# Patient Record
Sex: FEMALE | Race: BLACK OR AFRICAN AMERICAN | Employment: FULL TIME | URBAN - METROPOLITAN AREA
[De-identification: names, ages, dates, MRNs, and addresses within clinical notes are randomized per-mention and may not be internally consistent; named-entity substitution may affect disease eponyms.]

---

## 2017-03-31 ENCOUNTER — HOSPITAL ENCOUNTER (EMERGENCY)
Age: 57
Discharge: HOME | End: 2017-03-31
Attending: EMERGENCY MEDICINE | Admitting: EMERGENCY MEDICINE
Payer: SELF-PAY

## 2017-03-31 VITALS
WEIGHT: 170 LBS | TEMPERATURE: 103.1 F | HEART RATE: 98 BPM | DIASTOLIC BLOOD PRESSURE: 75 MMHG | BODY MASS INDEX: 25.76 KG/M2 | OXYGEN SATURATION: 98 % | HEIGHT: 68 IN | RESPIRATION RATE: 20 BRPM | SYSTOLIC BLOOD PRESSURE: 142 MMHG

## 2017-03-31 DIAGNOSIS — J10.1 INFLUENZA B: Primary | ICD-10-CM

## 2017-03-31 LAB
FLUAV AG NPH QL IA: NEGATIVE
FLUBV AG NOSE QL IA: POSITIVE

## 2017-03-31 PROCEDURE — 74011250637 HC RX REV CODE- 250/637: Performed by: PHYSICIAN ASSISTANT

## 2017-03-31 PROCEDURE — 99283 EMERGENCY DEPT VISIT LOW MDM: CPT

## 2017-03-31 PROCEDURE — 87804 INFLUENZA ASSAY W/OPTIC: CPT | Performed by: PHYSICIAN ASSISTANT

## 2017-03-31 RX ORDER — OSELTAMIVIR PHOSPHATE 75 MG/1
75 CAPSULE ORAL 2 TIMES DAILY
Qty: 10 CAP | Refills: 0 | Status: SHIPPED | OUTPATIENT
Start: 2017-03-31 | End: 2017-04-05

## 2017-03-31 RX ORDER — ACETAMINOPHEN 500 MG
1000 TABLET ORAL
Status: COMPLETED | OUTPATIENT
Start: 2017-03-31 | End: 2017-03-31

## 2017-03-31 RX ORDER — IBUPROFEN 400 MG/1
800 TABLET ORAL
Status: COMPLETED | OUTPATIENT
Start: 2017-03-31 | End: 2017-03-31

## 2017-03-31 RX ADMIN — ACETAMINOPHEN 1000 MG: 500 TABLET ORAL at 19:13

## 2017-03-31 RX ADMIN — IBUPROFEN 800 MG: 400 TABLET, FILM COATED ORAL at 19:13

## 2017-03-31 NOTE — DISCHARGE INSTRUCTIONS
Influenza (Flu): Care Instructions  Your Care Instructions  Influenza (flu) is an infection in the lungs and breathing passages. It is caused by the influenza virus. There are different strains, or types, of the flu virus from year to year. Unlike the common cold, the flu comes on suddenly and the symptoms, such as a cough, congestion, fever, chills, fatigue, aches, and pains, are more severe. These symptoms may last up to 10 days. Although the flu can make you feel very sick, it usually doesn't cause serious health problems. Home treatment is usually all you need for flu symptoms. But your doctor may prescribe antiviral medicine to prevent other health problems, such as pneumonia, from developing. Older people and those who have a long-term health condition, such as lung disease, are most at risk for having pneumonia or other health problems. Follow-up care is a key part of your treatment and safety. Be sure to make and go to all appointments, and call your doctor if you are having problems. Its also a good idea to know your test results and keep a list of the medicines you take. How can you care for yourself at home? · Get plenty of rest.  · Drink plenty of fluids, enough so that your urine is light yellow or clear like water. If you have kidney, heart, or liver disease and have to limit fluids, talk with your doctor before you increase the amount of fluids you drink. · Take an over-the-counter pain medicine if needed, such as acetaminophen (Tylenol), ibuprofen (Advil, Motrin), or naproxen (Aleve), to relieve fever, headache, and muscle aches. Read and follow all instructions on the label. No one younger than 20 should take aspirin. It has been linked to Reye syndrome, a serious illness. · Do not smoke. Smoking can make the flu worse. If you need help quitting, talk to your doctor about stop-smoking programs and medicines. These can increase your chances of quitting for good.   · Breathe moist air from a hot shower or from a sink filled with hot water to help clear a stuffy nose. · Before you use cough and cold medicines, check the label. These medicines may not be safe for young children or for people with certain health problems. · If the skin around your nose and lips becomes sore, put some petroleum jelly on the area. · To ease coughing:  ¨ Drink fluids to soothe a scratchy throat. ¨ Suck on cough drops or plain hard candy. ¨ Take an over-the-counter cough medicine that contains dextromethorphan to help you get some sleep. Read and follow all instructions on the label. ¨ Raise your head at night with an extra pillow. This may help you rest if coughing keeps you awake. · Take any prescribed medicine exactly as directed. Call your doctor if you think you are having a problem with your medicine. To avoid spreading the flu  · Wash your hands regularly, and keep your hands away from your face. · Stay home from school, work, and other public places until you are feeling better and your fever has been gone for at least 24 hours. The fever needs to have gone away on its own without the help of medicine. · Ask people living with you to talk to their doctors about preventing the flu. They may get antiviral medicine to keep from getting the flu from you. · To prevent the flu in the future, get a flu vaccine every fall. Encourage people living with you to get the vaccine. · Cover your mouth when you cough or sneeze. When should you call for help? Call 911 anytime you think you may need emergency care. For example, call if:  · You have severe trouble breathing. Call your doctor now or seek immediate medical care if:  · You have new or worse trouble breathing. · You seem to be getting much sicker. · You feel very sleepy or confused. · You have a new or higher fever. · You get a new rash.   Watch closely for changes in your health, and be sure to contact your doctor if:  · You begin to get better and then get worse. · You are not getting better after 1 week. Where can you learn more? Go to http://eve-john.info/. Enter H924 in the search box to learn more about \"Influenza (Flu): Care Instructions. \"  Current as of: May 23, 2016  Content Version: 11.2  © 2383-8591 [employer]. Care instructions adapted under license by [employer] (which disclaims liability or warranty for this information). If you have questions about a medical condition or this instruction, always ask your healthcare professional. [address] any warranty or liability for your use of this information.

## 2017-03-31 NOTE — ED PROVIDER NOTES
HPI Comments: Lynnette Montague is a 61 y.o. female who presents to the emergency department for evaluation of nasal congestion, subjective fever, body aches, HA, cough, chills x 3 days. She relates having tried DayQuil and NyQuil. She states she took some ibuprofen this morning. She tried to go to work, but was sent home. Pt relates some post-tussive vomiting. 2 episodes of loose stools. Relates an ill contact at work. Pt denies any dizziness or light headedness, CP or discomfort, SOB, constipation, abd pain, back pain, diaphoresis, melena/hematochezia, dysuria, hematuria, frequency, focal weakness/numbness/tingling, or rash. Patient has no other complaints at this time. PCP: Gagan Justice MD           History reviewed. No pertinent past medical history. History reviewed. No pertinent surgical history. History reviewed. No pertinent family history. Social History     Social History    Marital status:      Spouse name: N/A    Number of children: N/A    Years of education: N/A     Occupational History    Not on file. Social History Main Topics    Smoking status: Never Smoker    Smokeless tobacco: Not on file    Alcohol use No    Drug use: No    Sexual activity: Not on file     Other Topics Concern    Not on file     Social History Narrative    No narrative on file         ALLERGIES: Review of patient's allergies indicates no known allergies. Review of Systems   Constitutional: Positive for chills and fever. HENT: Positive for congestion and rhinorrhea. Negative for sore throat. Respiratory: Positive for cough. Negative for shortness of breath. Cardiovascular: Negative for chest pain. Gastrointestinal: Positive for diarrhea, nausea and vomiting. Negative for abdominal pain, blood in stool and constipation. Genitourinary: Negative for dysuria, frequency and hematuria. Musculoskeletal: Positive for myalgias. Negative for back pain.    Skin: Negative for rash and wound. Neurological: Positive for headaches. Negative for dizziness and numbness. Vitals:    03/31/17 1902   BP: 142/75   Pulse: 98   Resp: 20   Temp: (!) 103.1 °F (39.5 °C)   SpO2: 98%   Weight: 77.1 kg (170 lb)   Height: 5' 8\" (1.727 m)            Physical Exam   Constitutional: She is oriented to person, place, and time. She appears well-developed and well-nourished. No distress. HENT:   Head: Normocephalic and atraumatic. Nose: Rhinorrhea present. Mouth/Throat: Oropharynx is clear and moist. No oropharyngeal exudate. Eyes: EOM are normal. Pupils are equal, round, and reactive to light. Right eye exhibits no discharge. Left eye exhibits no discharge. Bilateral conjunctival erythema   Neck: Normal range of motion. Neck supple. No thyromegaly present. Cardiovascular: Normal rate and intact distal pulses. Exam reveals no gallop and no friction rub. No murmur heard. Pulmonary/Chest: Effort normal and breath sounds normal. No respiratory distress. She has no wheezes. She has no rales. She exhibits no tenderness. Lungs CTAB   Lymphadenopathy:     She has no cervical adenopathy. Neurological: She is alert and oriented to person, place, and time. Skin: Skin is warm and dry. No rash noted. She is not diaphoretic. Psychiatric: She has a normal mood and affect. Her behavior is normal.   Nursing note and vitals reviewed. MDM  Number of Diagnoses or Management Options  Influenza B: new and requires workup  Diagnosis management comments: Differential Diagnosis:  influenza, mononucleosis, acute bronchitis, URI, streptococcal pharyngitis, pertussis, pneumonia, asthma exacerbation, allergic rhinitis      Plan: Pt presents in NAD, febrile at 103F. Exam and hpi c/w viral etiology. Flu test is positive. Will DC home with tamiflu. Advised to drink plenty of fluids and get plenty of rest.  At this time, patient is stable and appropriate for discharge home.   Patient demonstrates understanding of current diagnoses and is in agreement with the treatment plan. They are advised that while the likelihood of serious underlying condition is low at this point given the evaluation performed today, we cannot fully rule it out. They are advised to immediately return with any new symptoms or worsening of current condition. All questions have been answered. Patient is given educational material regarding their diagnoses, including danger symptoms and when to return to the ED. Amount and/or Complexity of Data Reviewed  Clinical lab tests: ordered and reviewed  Review and summarize past medical records: yes    Risk of Complications, Morbidity, and/or Mortality  Presenting problems: moderate  Diagnostic procedures: moderate  Management options: moderate    Patient Progress  Patient progress: improved    ED Course       Procedures    -------------------------------------------------------------------------------------------------------------------  Orders:  Orders Placed This Encounter    INFLUENZA A & B AG (RAPID TEST)     Standing Status:   Standing     Number of Occurrences:   1    ibuprofen (MOTRIN) tablet 800 mg    acetaminophen (TYLENOL) tablet 1,000 mg    oseltamivir (TAMIFLU) 75 mg capsule     Sig: Take 1 Cap by mouth two (2) times a day for 5 days. Dispense:  10 Cap     Refill:  0      Lab Results:   Recent Results (from the past 12 hour(s))   INFLUENZA A & B AG (RAPID TEST)    Collection Time: 03/31/17  7:05 PM   Result Value Ref Range    Influenza A Antigen NEGATIVE  NEG      Influenza B Antigen POSITIVE (A) NEG       Progress Notes:  7:10 PM:  Cherelle Parnell PA-C was at the pt's bedside, assessed the pt and answered the pt's questions regarding treatment.    -------------------------------------------------------------------------------------------------------------------    Disposition:  Diagnosis:   1.  Influenza B        [facility]    Follow-up Information Follow up With Details Comments Contact Koby Ramos MD Call in 2 days For follow-up [address] [facility]      [address] [facility] [place] EMERGENCY DEPT Go to As needed, If symptoms worsen [address] [facility]  [phone]          Patient's Medications   Start Taking    OSELTAMIVIR (TAMIFLU) 75 MG CAPSULE    Take 1 Cap by mouth two (2) times a day for 5 days.    Continue Taking    No medications on file   These Medications have changed    No medications on file   Stop Taking    No medications on file

## 2018-05-17 ENCOUNTER — HOSPITAL ENCOUNTER (OUTPATIENT)
Dept: LAB | Age: 58
Discharge: HOME | End: 2018-05-17

## 2018-05-17 LAB — SENTARA SPECIMEN COL,SENBCF: NORMAL

## 2018-05-17 PROCEDURE — 99001 SPECIMEN HANDLING PT-LAB: CPT | Performed by: PHYSICIAN ASSISTANT

## 2018-05-22 LAB — COLONOSCOPY, EXTERNAL: NORMAL

## 2021-06-17 ENCOUNTER — OFFICE VISIT (OUTPATIENT)
Dept: INTERNAL MEDICINE CLINIC | Age: 61
End: 2021-06-17
Payer: COMMERCIAL

## 2021-06-17 VITALS
OXYGEN SATURATION: 98 % | HEART RATE: 72 BPM | DIASTOLIC BLOOD PRESSURE: 59 MMHG | BODY MASS INDEX: 31.28 KG/M2 | WEIGHT: 206.4 LBS | RESPIRATION RATE: 14 BRPM | HEIGHT: 68 IN | SYSTOLIC BLOOD PRESSURE: 130 MMHG | TEMPERATURE: 97.9 F

## 2021-06-17 DIAGNOSIS — M25.511 ACUTE PAIN OF RIGHT SHOULDER: ICD-10-CM

## 2021-06-17 DIAGNOSIS — F40.240 CLAUSTROPHOBIA: ICD-10-CM

## 2021-06-17 DIAGNOSIS — R20.2 BILATERAL NUMBNESS AND TINGLING OF ARMS AND LEGS: Primary | ICD-10-CM

## 2021-06-17 DIAGNOSIS — E78.00 HYPERCHOLESTEROLEMIA: ICD-10-CM

## 2021-06-17 DIAGNOSIS — R60.0 BILATERAL LOWER EXTREMITY EDEMA: ICD-10-CM

## 2021-06-17 DIAGNOSIS — R20.0 BILATERAL NUMBNESS AND TINGLING OF ARMS AND LEGS: Primary | ICD-10-CM

## 2021-06-17 DIAGNOSIS — I10 ESSENTIAL HYPERTENSION: ICD-10-CM

## 2021-06-17 PROCEDURE — 99215 OFFICE O/P EST HI 40 MIN: CPT | Performed by: INTERNAL MEDICINE

## 2021-06-17 RX ORDER — DIAZEPAM 2 MG/1
TABLET ORAL
Qty: 10 TABLET | Refills: 0 | Status: SHIPPED | OUTPATIENT
Start: 2021-06-17

## 2021-06-17 RX ORDER — GABAPENTIN 300 MG/1
CAPSULE ORAL
Qty: 180 CAPSULE | Refills: 1 | Status: SHIPPED | OUTPATIENT
Start: 2021-06-17

## 2021-06-17 RX ORDER — LOSARTAN POTASSIUM 100 MG/1
TABLET ORAL
COMMUNITY
Start: 2021-05-03 | End: 2021-11-08 | Stop reason: SDUPTHER

## 2021-06-17 RX ORDER — METOPROLOL SUCCINATE 25 MG/1
TABLET, EXTENDED RELEASE ORAL
COMMUNITY
Start: 2021-05-03 | End: 2021-11-08 | Stop reason: SDUPTHER

## 2021-06-17 RX ORDER — METHYLPREDNISOLONE 4 MG/1
TABLET ORAL
Qty: 1 DOSE PACK | Refills: 0 | Status: SHIPPED | OUTPATIENT
Start: 2021-06-17 | End: 2021-07-12 | Stop reason: ALTCHOICE

## 2021-06-17 RX ORDER — AMLODIPINE BESYLATE 10 MG/1
10 TABLET ORAL DAILY
Qty: 90 TABLET | Refills: 3 | Status: SHIPPED | OUTPATIENT
Start: 2021-06-17 | End: 2021-11-08

## 2021-06-17 RX ORDER — AMLODIPINE BESYLATE 10 MG/1
10 TABLET ORAL DAILY
COMMUNITY
End: 2021-06-17 | Stop reason: SDUPTHER

## 2021-06-17 NOTE — PROGRESS NOTES
1. Have you been to the ER, urgent care clinic or hospitalized since your last visit? NO           2. Have you seen or consulted any other health care providers outside of the [address] [place] [address] since your last visit (Include any pap smears or colon screening)? NO    Do you have an Advanced Directive? NO    Would you like information on Advanced Directives?  NO

## 2021-06-18 LAB
A-G RATIO,AGRAT: 1.6 RATIO (ref 1.1–2.6)
ALBUMIN SERPL-MCNC: 4.2 G/DL (ref 3.5–5)
ALP SERPL-CCNC: 152 U/L (ref 40–120)
ALT SERPL-CCNC: 23 U/L (ref 5–40)
ANION GAP SERPL CALC-SCNC: 9 MMOL/L (ref 3–15)
AST SERPL W P-5'-P-CCNC: 22 U/L (ref 10–37)
BILIRUB SERPL-MCNC: 0.3 MG/DL (ref 0.2–1.2)
BILIRUB UR QL: NEGATIVE
BUN SERPL-MCNC: 12 MG/DL (ref 6–22)
CA OXALATE CRYSTALS,CAOXC: PRESENT
CALCIUM SERPL-MCNC: 9.9 MG/DL (ref 8.4–10.5)
CHLORIDE SERPL-SCNC: 104 MMOL/L (ref 98–110)
CHOLEST SERPL-MCNC: 207 MG/DL (ref 110–200)
CLARITY: CLEAR
CO2 SERPL-SCNC: 29 MMOL/L (ref 20–32)
COLOR UR: YELLOW
CREAT SERPL-MCNC: 0.6 MG/DL (ref 0.8–1.4)
EPITHELIAL,EPSU: ABNORMAL /HPF (ref 0–2)
GFRAA, 66117: >60
GFRNA, 66118: >60
GLOBULIN,GLOB: 2.6 G/DL (ref 2–4)
GLUCOSE SERPL-MCNC: 104 MG/DL (ref 70–99)
GLUCOSE UR QL: NEGATIVE MG/DL
HDLC SERPL-MCNC: 4.3 MG/DL (ref 0–5)
HDLC SERPL-MCNC: 48 MG/DL
HGB UR QL STRIP: ABNORMAL
KETONES UR QL STRIP.AUTO: NEGATIVE MG/DL
LDL/HDL RATIO,LDHD: 2.9
LDLC SERPL CALC-MCNC: 140 MG/DL (ref 50–99)
LEUKOCYTE ESTERASE: NEGATIVE
NITRITE UR QL STRIP.AUTO: NEGATIVE
NON-HDL CHOLESTEROL, 011976: 159 MG/DL
PH UR STRIP: 5 PH (ref 5–8)
POTASSIUM SERPL-SCNC: 4.2 MMOL/L (ref 3.5–5.5)
PROT SERPL-MCNC: 6.8 G/DL (ref 6.2–8.1)
PROT UR QL STRIP: NEGATIVE MG/DL
RBC #/AREA URNS HPF: ABNORMAL /HPF
SODIUM SERPL-SCNC: 142 MMOL/L (ref 133–145)
SP GR UR: 1.02 (ref 1–1.03)
TRIGL SERPL-MCNC: 94 MG/DL (ref 40–149)
URINE ASCORBIC ACID: NEGATIVE MG/DL
UROBILINOGEN UR STRIP-MCNC: <2 MG/DL
VLDLC SERPL CALC-MCNC: 19 MG/DL (ref 8–30)
WBC URNS QL MICRO: ABNORMAL /HPF (ref 0–2)

## 2021-06-18 NOTE — PROGRESS NOTES
BEN Arriola is a 26-year-old female, known to me from my previous practice. She continues to have pain and tingling in both upper extremities and both lower extremities. She had some x-rays of her hips and low back, and received some injections by a spine specialist, but has yet to have an MRI of her cervical and lumbosacral spine. She says she is claustrophobic and will need something to relax her prior to having the study. She most recently has noted more pain in her right shoulder, radiating from her neck to her elbow. There has been no specific trauma. She was previously eating a lot of crabs, wonders if this contributed to her hyperlipidemia. She has been much more careful with this lately, and would like to recheck her cholesterol. She also complains of bilateral lower extremity edema. This is better on awakening, worsens as the day progresses. She occasionally takes anti-inflammatories, but not frequently. She does not think she had changed her sodium intake. Past Medical History:   Diagnosis Date    Bilateral numbness and tingling of arms and legs     Essential hypertension     Hypercholesterolemia         History reviewed. No pertinent surgical history. Current Outpatient Medications   Medication Sig Dispense Refill    losartan (COZAAR) 100 mg tablet       metoprolol succinate (TOPROL-XL) 25 mg XL tablet       amLODIPine (NORVASC) 10 mg tablet Take 1 Tablet by mouth daily.  90 Tablet 3    diazePAM (VALIUM) 2 mg tablet 1 or 2 po q 12 hrs PRN anxiety 10 Tablet 0    gabapentin (NEURONTIN) 300 mg capsule 1 po bid-- new dose, warning sedating 180 Capsule 1    methylPREDNISolone (MEDROL DOSEPACK) 4 mg tablet Take early in day, with food, no NSAIDs while taking 1 Dose Pack 0        No Known Allergies     Social History     Socioeconomic History    Marital status:      Spouse name: Not on file    Number of children: Not on file    Years of education: Not on file  Highest education level: Not on file   Occupational History    Not on file   Tobacco Use    Smoking status: Former Smoker     Packs/day: 0.25     Years: 20.00     Pack years: 5.00     Quit date:      Years since quittin.4    Smokeless tobacco: Never Used   Vaping Use    Vaping Use: Never used   Substance and Sexual Activity    Alcohol use: No    Drug use: No    Sexual activity: Not on file   Other Topics Concern    Not on file   Social History Narrative    Not on file     Social Determinants of Health     Financial Resource Strain:     Difficulty of Paying Living Expenses:    Food Insecurity:     Worried About Running Out of Food in the Last Year:     [address] in the Last Year:    Transportation Needs:     Lack of Transportation (Medical):  Lack of Transportation (Non-Medical):    Physical Activity:     Days of Exercise per Week:     Minutes of Exercise per Session:    Stress:     Feeling of Stress :    Social Connections:     Frequency of Communication with Friends and Family:     Frequency of Social Gatherings with Friends and Family:     Attends Judaism Services:     Active Member of Clubs or Organizations:     Attends Club or Organization Meetings:     Marital Status:    Intimate Partner Violence:     Fear of Current or Ex-Partner:     Emotionally Abused:     Physically Abused:     Sexually Abused:         History reviewed. No pertinent family history. Visit Vitals  BP (!) 130/59 (BP 1 Location: Left upper arm, BP Patient Position: Sitting)   Pulse 72   Temp 97.9 °F (36.6 °C) (Temporal)   Resp 14   Ht 5' 8\" (1.727 m)   Wt 206 lb 6.4 oz (93.6 kg)   SpO2 98%   BMI 31.38 kg/m²        Review of Systems   Constitutional: Negative for chills and fever. Eyes: Negative for blurred vision. Respiratory: Negative for shortness of breath. Cardiovascular: Negative for chest pain. Gastrointestinal: Negative for blood in stool.    Genitourinary: Negative for hematuria. Musculoskeletal: Negative for falls. Neurological: Negative for headaches. Psychiatric/Behavioral: Negative for depression. The patient is not nervous/anxious. No PND or orthopnea. Physical Exam  Constitutional:       Appearance: She is obese. She is not ill-appearing. Comments: And discomfort on moving around the room. HENT:      Mouth/Throat:      Mouth: Mucous membranes are moist.   Eyes:      General: No scleral icterus. Conjunctiva/sclera: Conjunctivae normal.   Neck:      Comments: Normal carotid upstroke. Lymph: No cervical or supraclavicular lymphadenopathy. Cardiovascular:      Rate and Rhythm: Normal rate and regular rhythm. Pulses: Normal pulses. Heart sounds: No friction rub. No gallop. Pulmonary:      Effort: Pulmonary effort is normal.      Breath sounds: Normal breath sounds. Abdominal:      General: Abdomen is flat. Palpations: Abdomen is soft. There is no mass. Tenderness: There is no abdominal tenderness. Musculoskeletal:      Cervical back: Neck supple. Comments: No clubbing, no cyanosis. There is bilateral 1+ pitting edema in the lower extremities. There are no cords or tenderness. Her upper and lower extremity strength is normal.  Her left shoulder is not warm or red. Skin:     General: Skin is warm and dry. Neurological:      Mental Status: She is alert and oriented to person, place, and time. Comments: He experiences worsened pain and numbness in her upper and lower extremities with neck extension and flexion. She also has bilateral straight leg raises. Psychiatric:         Mood and Affect: Mood normal.                  Diagnoses and all orders for this visit:    1. Bilateral numbness and tingling of arms and legs  Comments:  Differential diagnoses include includes cervical radiculopathy and or lumbar radiculopathy. Needs MRI of these areas, orders placed. Orders:  -     URINALYSIS W/MICROSCOPIC;  Future  - MRI CERV SPINE WO CONT; Future  -     MRI LUMB SPINE WO CONT; Future  -     gabapentin (NEURONTIN) 300 mg capsule; 1 po bid-- new dose, warning sedating    2. Essential hypertension  Comments:  Controlled, continue medication, refilled. Orders:  -     amLODIPine (NORVASC) 10 mg tablet; Take 1 Tablet by mouth daily.  -     METABOLIC PANEL, COMPREHENSIVE; Future  -     URINALYSIS W/MICROSCOPIC; Future    3. Bilateral lower extremity edema  Comments:  likely venous insufficiency. No CHF symptoms. TSH, urine, CMP. Elevate legs, wear supportive socks, limit sodium. Neurontin, Norvasc can contribute  Orders:  -     METABOLIC PANEL, COMPREHENSIVE; Future    4. Acute pain of right shoulder  Comments:  Radicular by exam.  Medrol pack as directed, warnings reviewed. Increase gabapentin to 300 mg twice daily, watch for sedation. 5. Hypercholesterolemia  Comments:  Check nonfasting panel today  Orders:  -     METABOLIC PANEL, COMPREHENSIVE; Future  -     LIPID PANEL; Future    6. Claustrophobia  Comments: For MRI, diazepam 2 mg 1 or 2 every 12 hours as needed, titrate to effect so that she is calm, but and follow commands. Reminded someone will have to drive her  Orders:  -     diazePAM (VALIUM) 2 mg tablet; 1 or 2 po q 12 hrs PRN anxiety    Other orders  -     methylPREDNISolone (MEDROL DOSEPACK) 4 mg tablet; Take early in day, with food, no NSAIDs while taking    Total time spent on encounter, in minutes:    Review of old office records, plus precharting: 10  Review of updated medical, social, and family history: 3  Review of electronic medical records, specifically x-ray reports5  Review of systems10  Physical exam10  Counseling5  Orders3  Completion of chart6    Total time: 52 minutes. Total time spent on encounter:  In minutes:             Ocsar Burton MD

## 2021-06-24 DIAGNOSIS — R20.0 BILATERAL NUMBNESS AND TINGLING OF ARMS AND LEGS: ICD-10-CM

## 2021-06-24 DIAGNOSIS — R20.2 BILATERAL NUMBNESS AND TINGLING OF ARMS AND LEGS: ICD-10-CM

## 2021-06-26 ENCOUNTER — TELEPHONE (OUTPATIENT)
Dept: INTERNAL MEDICINE CLINIC | Age: 61
End: 2021-06-26

## 2021-06-26 DIAGNOSIS — E78.00 HYPERCHOLESTEROLEMIA: Primary | ICD-10-CM

## 2021-06-26 NOTE — TELEPHONE ENCOUNTER
Let her know labs show cholesterol 207. Her \"good\" HDl cholesterol is low at 48, goal is over 60 in women to be cardioprotective. This can be increased with weight loss and physical activity, as well as with foods with \"good fat\", such as avocado, fish, canola or olive oil, nuts. Her calculated risk of having a heart attack before she turns 71 is high at 1 in 6, because she also has the added cardiovascular risk of high blood pressure. I recommend starting a cholesterol-lowering medication, let me know if she agrees or if she has any questions about this.

## 2021-06-28 RX ORDER — PRAVASTATIN SODIUM 20 MG/1
TABLET ORAL
Qty: 90 TABLET | Refills: 1 | Status: SHIPPED | OUTPATIENT
Start: 2021-06-28

## 2021-06-28 NOTE — TELEPHONE ENCOUNTER
Patient aware of lab results and medication suggestions per Dr Mely Mejia. Patient wanted to ask if she could take an OTC cholest-off medication that recommends taking 2 tabs twice a day at 1800 mg. Since reading the label, the patient would rather the doctor prescribe 1 tab daily. Patient agrees to take cholesterol medication from Dr Carley Mejia and have it sent to the [facility] in [place] on file.

## 2021-07-03 ENCOUNTER — HOSPITAL ENCOUNTER (OUTPATIENT)
Age: 61
Discharge: HOME | End: 2021-07-03
Attending: INTERNAL MEDICINE
Payer: COMMERCIAL

## 2021-07-03 PROCEDURE — 72148 MRI LUMBAR SPINE W/O DYE: CPT

## 2021-07-03 PROCEDURE — 72141 MRI NECK SPINE W/O DYE: CPT

## 2021-07-08 ENCOUNTER — TELEPHONE (OUTPATIENT)
Dept: INTERNAL MEDICINE CLINIC | Age: 61
End: 2021-07-08

## 2021-07-08 DIAGNOSIS — R59.0 LYMPHADENOPATHY OF HEAD AND NECK REGION: Primary | ICD-10-CM

## 2021-07-08 DIAGNOSIS — K83.8 COMMON BILE DUCT DILATATION: ICD-10-CM

## 2021-07-08 NOTE — TELEPHONE ENCOUNTER
I called the patient back to let her know as soon as the doctor has a chance to read the report we will call back with results.

## 2021-07-08 NOTE — TELEPHONE ENCOUNTER
Dr Joanne Nevarez called and spoke to patient about MRI results.      I faxed over orders for neck CT TO Nohemi Krishnamurthy [phone]

## 2021-07-09 DIAGNOSIS — R20.0 BILATERAL NUMBNESS AND TINGLING OF ARMS AND LEGS: Primary | ICD-10-CM

## 2021-07-09 DIAGNOSIS — R20.2 BILATERAL NUMBNESS AND TINGLING OF ARMS AND LEGS: Primary | ICD-10-CM

## 2021-07-09 RX ORDER — TRAMADOL HYDROCHLORIDE 50 MG/1
50 TABLET ORAL
Qty: 90 TABLET | Refills: 2 | Status: SHIPPED | OUTPATIENT
Start: 2021-07-09 | End: 2021-07-12 | Stop reason: ALTCHOICE

## 2021-07-12 ENCOUNTER — OFFICE VISIT (OUTPATIENT)
Dept: INTERNAL MEDICINE CLINIC | Age: 61
End: 2021-07-12
Payer: COMMERCIAL

## 2021-07-12 VITALS
HEART RATE: 85 BPM | SYSTOLIC BLOOD PRESSURE: 148 MMHG | TEMPERATURE: 98.4 F | RESPIRATION RATE: 12 BRPM | HEIGHT: 68 IN | OXYGEN SATURATION: 97 % | WEIGHT: 207.6 LBS | DIASTOLIC BLOOD PRESSURE: 59 MMHG | BODY MASS INDEX: 31.46 KG/M2

## 2021-07-12 DIAGNOSIS — M54.16 LUMBAR RADICULOPATHY, RIGHT: Primary | ICD-10-CM

## 2021-07-12 PROCEDURE — 99214 OFFICE O/P EST MOD 30 MIN: CPT | Performed by: INTERNAL MEDICINE

## 2021-07-12 RX ORDER — HYDROCODONE BITARTRATE AND ACETAMINOPHEN 5; 325 MG/1; MG/1
1 TABLET ORAL
Qty: 40 TABLET | Refills: 0 | Status: SHIPPED | OUTPATIENT
Start: 2021-07-12 | End: 2021-07-26

## 2021-07-12 NOTE — PROGRESS NOTES
HPI     Tai Francois returns since she is having severe muscle spasms in her right low back and right thigh despite tramadol and oral steroids. .  She cannot get into a comfortable positionsitting hurts, walking hurts, lying down hurts. She would also like a work note she was unable to go today and would like a few days off. Past Medical History:   Diagnosis Date    Bilateral numbness and tingling of arms and legs     Essential hypertension     Hypercholesterolemia         No past surgical history on file. Current Outpatient Medications   Medication Sig Dispense Refill    HYDROcodone-acetaminophen (NORCO) 5-325 mg per tablet Take 1 Tablet by mouth every four (4) hours as needed for Pain for up to 14 days. Max Daily Amount: 6 Tablets. 40 Tablet 0    pravastatin (PRAVACHOL) 20 mg tablet 1 p.o. daily 90 Tablet 1    losartan (COZAAR) 100 mg tablet       metoprolol succinate (TOPROL-XL) 25 mg XL tablet       amLODIPine (NORVASC) 10 mg tablet Take 1 Tablet by mouth daily.  90 Tablet 3    diazePAM (VALIUM) 2 mg tablet 1 or 2 po q 12 hrs PRN anxiety 10 Tablet 0    gabapentin (NEURONTIN) 300 mg capsule 1 po bid-- new dose, warning sedating 180 Capsule 1    methylPREDNISolone (MEDROL DOSEPACK) 4 mg tablet Take early in day, with food, no NSAIDs while taking (Patient not taking: Reported on 2021) 1 Dose Pack 0        No Known Allergies     Social History     Socioeconomic History    Marital status:      Spouse name: Not on file    Number of children: Not on file    Years of education: Not on file    Highest education level: Not on file   Occupational History    Not on file   Tobacco Use    Smoking status: Former Smoker     Packs/day: 0.25     Years: 20.00     Pack years: 5.00     Quit date:      Years since quittin.5    Smokeless tobacco: Never Used   Vaping Use    Vaping Use: Never used   Substance and Sexual Activity    Alcohol use: No    Drug use: No    Sexual activity: Not on file   Other Topics Concern    Not on file   Social History Narrative    Not on file     Social Determinants of Health     Financial Resource Strain:     Difficulty of Paying Living Expenses:    Food Insecurity:     Worried About Running Out of Food in the Last Year:     [address] in the Last Year:    Transportation Needs:     Lack of Transportation (Medical):  Lack of Transportation (Non-Medical):    Physical Activity:     Days of Exercise per Week:     Minutes of Exercise per Session:    Stress:     Feeling of Stress :    Social Connections:     Frequency of Communication with Friends and Family:     Frequency of Social Gatherings with Friends and Family:     Attends Judaism Services:     Active Member of Clubs or Organizations:     Attends Club or Organization Meetings:     Marital Status:    Intimate Partner Violence:     Fear of Current or Ex-Partner:     Emotionally Abused:     Physically Abused:     Sexually Abused:         No family history on file. Visit Vitals  BP (!) 148/59 (BP 1 Location: Left upper arm, BP Patient Position: Sitting)   Pulse 85   Temp 98.4 °F (36.9 °C) (Temporal)   Resp 12   Ht 5' 8\" (1.727 m)   Wt 207 lb 9.6 oz (94.2 kg)   SpO2 97%   BMI 31.57 kg/m²        Review of Systems   Musculoskeletal: Positive for back pain. Positive for pain radiating down her anterior thigh. Physical Exam  Constitutional:       General: She is in acute distress. Eyes:      General: No scleral icterus. Conjunctiva/sclera: Conjunctivae normal.   Neck:      Comments: Lymph: No cervical or supraclavicular lymphadenopathy. Musculoskeletal:      Cervical back: Neck supple. Comments: N back without rashes or masses. O clubbing. There is no pain with right hip flexion and external rotation. Skin:     General: Skin is warm and dry. Neurological:      Mental Status: She is alert and oriented to person, place, and time.    Psychiatric: Behavior: Behavior normal.                  Diagnoses and all orders for this visit:    1. Lumbar radiculopathy, right  Comments:  severe despite steroid, Ultram--change ot Vicodin 5/325 1 q 4 hrs PRN,limit 6/d, refer spine specialist.  Work note off today, 7/13, 7/14. Orders:  -     HYDROcodone-acetaminophen (NORCO) 5-325 mg per tablet; Take 1 Tablet by mouth every four (4) hours as needed for Pain for up to 14 days. Max Daily Amount: 6 Tablets.  -     REFERRAL TO ORTHOPEDICS         Follow-up and Dispositions    · Return has appt-- give her note off owrk today, tomorrow and 7/14/21.               Connie Azar MD

## 2021-07-13 ENCOUNTER — TELEPHONE (OUTPATIENT)
Dept: INTERNAL MEDICINE CLINIC | Age: 61
End: 2021-07-13

## 2021-07-13 NOTE — TELEPHONE ENCOUNTER
I called the patient, and gave her the number to orthopaedic surgery to expedite an appt. The patient is in sever pain, and needs a work note to take additional time off work until [employer] 7/21/21. Patient wants the letter to be sent through [facility] message. She has rescheduled the gastro referral until August. Patient says she's in too much pain to go to gastro right now.

## 2021-07-13 NOTE — LETTER
NOTIFICATION RETURN TO WORK / SCHOOL    7/14/2021 9:50 AM    Ms. Janet Moreno  [address] [facility]  [address] [address]      To Whom It May Concern:    Diamond Márquez is currently under the care of Phyllis Ho. She will return to work/school on: 7/21/21. If there are questions or concerns please have the patient contact our office.         Sincerely,      Tressa Mccord MD

## 2021-08-03 ENCOUNTER — HOSPITAL ENCOUNTER (OUTPATIENT)
Dept: PHYSICAL THERAPY | Age: 61
Discharge: HOME | End: 2021-08-03
Payer: COMMERCIAL

## 2021-08-03 PROCEDURE — 97161 PT EVAL LOW COMPLEX 20 MIN: CPT

## 2021-08-03 PROCEDURE — 97110 THERAPEUTIC EXERCISES: CPT

## 2021-08-03 NOTE — PROGRESS NOTES
[address] [facility] PHYSICAL THERAPY   [facility] [address],[facility], [address] [facility] - Phone: [phone]  Fax: [phone] / [address] [facility]  Patient Name: Diamond Márquez : 1960   Medical   Diagnosis: Low back pain [M54.5] Treatment Diagnosis: Low back pain [M54.5]   Onset Date: chronic     Referral Source: Eugenio Montano DO Start of Care [facility]): 8/3/2021   Prior Hospitalization: See medical history Provider #: [id number]   Prior Level of Function: I in ADLs, able to stand for prolonged periods without pain/numbness, able to bend without fear of increasing symptoms   Comorbidities: Right shoulder pain; HTN   Medications: Verified on Patient Summary List   The Plan of Care and following information is based on the information from the initial evaluation.   ========================================================================  Assessment / key information: Patient is a 57 y.o. female who presents to [facility] with a diagnosis of Low back pain [M54.5]. Patient is her own historian. 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 Pt presents with LBP (R>L) and BLE numbness radiating narayan-distally to the bilateral feet which has progressively worsened over time. She reports having an epidural injection on 21, which offered significant relief of LBP and leg pain. Patient continues to have mild c/o and numbness although unable to identify a specific dermatomal distribution. Pt is without LE buckling/LOB/falls//bowl or bladder issues/pain with coughing and/or sneezing at this time. A recent MRI was remarkable for: \"Straightening of the lumbar lordosis. Trace retrolisthesis of L3 on L4 and L4 on L5. At L4/L5 there is a central annular fissure superimposed on a small to moderate-sized disc protrusion with mild spinal canal and mild to moderate right and moderate left foraminal stenosis.  There is displacement of the left exiting nerve root but no clear nerve distortion. \" Bending backwards increases pain and patient reports being unable to lay prone at this time. Current Deficits include: pain, decreased mobility, decreased strength, and decreased postural awareness with resulting limitations in ADL's and in functional abilities. Patient will benefit from a comprehensive POC/HEP to address impairments and restore function in order to return to prior level of function and prevent secondary impairments. Impairments are as follows:   Pain: current pain level 2/10, pain level at worst 8/10, and pain level at best 0/10 TTP R QL and sacral sulcus  Posture right ilium higher than left  Gait antalgic with decreased B hip extension and reciprocal arm swing  AROM/PROM (in degrees unless otherwise specified) L-S flexion decreased sacral angle, extension-able to obtain 0 deg (neutral) prior to onset of pain; side flexion left 10deg R 20deg (pain with left side flexion felt on the right)  Strength BLEs grossly 4/5 throughout except R hip abd 3+/5 pain hip ext NT  Special Tests + seated flexion test and supine-sit for LLD; TLLD L 90cm R 91cm; decreased BLE DTRs L4 and S1 1+; + pain with MALICK and decreased with repeated flexion in standing; +ASLR RLE 35-70deg  Functional Tests 25% bridge   Functional Deficits include: decreased ability to stand for prolonged periods of time; numbness. Patient's FOTO score was a 53/100 indicating decreased function.  Patient will benefit from a POC addressing such impairments and limitations in order to improve quality of life and return to PLOF.    ========================================================================  Eval Complexity: History: MEDIUM  Complexity : 1-2 comorbidities / personal factors will impact the outcome/ POC Exam:MEDIUM Complexity : 3 Standardized tests and measures addressing body structure, function, activity limitation and / or participation in recreation Presentation: LOW Complexity : Stable, uncomplicated  Clinical Decision Making:MEDIUM Complexity : FOTO score of 26-74Overall Complexity:LOW   Problem List: pain affecting function, decrease ROM, decrease strength, edema affecting function, impaired gait/ balance, decrease ADL/ functional abilitiies, decrease activity tolerance, decrease flexibility/ joint mobility and decrease transfer abilities   Treatment Plan may include any combination of the following: Therapeutic exercise, Therapeutic activities, Neuromuscular re-education, Physical agent/modality, Gait/balance training, Manual therapy, Aquatic therapy, Patient education, Self Care training, Functional mobility training, Home safety training and Stair training  Patient / Family readiness to learn indicated by: asking questions  Persons(s) to be included in education: patient (P)  Barriers to Learning/Limitations: None  Measures taken: A HEP was initiated to assist with POC in restoring function   Patient Goal (s): \"leg pain and numbness will go away\"   Patient self reported health status: fair  Rehabilitation Potential: good  Short Term Goals: To be accomplished in 4 treatments: 1. Goal: Patient will be independent and compliant with HEP in order to progress toward long term goals. Status at last note/certification: issued and reviewed  2. Goal: Patient will demonstrate a +4 on GROC in order to assist with improved QOL  Status at last note/certification: NA    Long Term Goals: To be accomplished in 8 treatments: 1. Goal: Patient will improve FOTO assessment score to 61 pts in order to indicate improved functional abilities. Status at last note/certification: 53  2. Goal: Patient will demonstrate a 100% pain free bridge in order to assist with lumbopelvic stability for transfers         Status at last note/certification: 56% with pain  3. Goal: Patient will demonstrate a negative ASLR on the RLE for 3 consecutive sessions in order to improve neurodynamic mobility and improve ability to perform self stretches         Status at last note/certification: + RLE ASLR 35-70deg   4. Goal: Patient will demonstrate increased right hip abduction strength by 1 grade in order to assist with stabilization for prolonged standing activities at work              Status at last note/certification: 3+ with pain    Frequency / Duration:     Patient to be seen  1-2  times per week for 8  treatments:  Patient / Caregiver education and instruction: exercises    Therapist Signature: Fam Girard PT Date: 7/3/0694   Certification Period:  Time: 8:01 AM   ========================================================================  I certify that the above Physical Therapy Services are being furnished while the patient is under my care. I agree with the treatment plan and certify that this therapy is necessary. Physician Signature:    _____  Date:     Time:______  Serge Speaker, DO  Please sign and return to [facility], [facility]. or you may fax the signed copy to [phone]. Thank you.

## 2021-08-03 NOTE — PROGRESS NOTES
PHYSICAL THERAPY - DAILY TREATMENT NOTE    Patient Name: Adela Maya        Date: 8/3/2021  : 1960   yes Patient  Verified  Visit #:   1   of   8  Insurance: Payor: Samantha Riley / Plan: [place] [employer] PPO / Product Type: PPO /      In time: 800 Out time: 837   Total Treatment Time: 37     Medicare/[facility] Time Tracking (below)   Total Timed Codes (min):   1:1 Treatment Time:       TREATMENT AREA =  Low back pain [M54.5]    SUBJECTIVE  Pain Level (on 0 to 10 scale):  2  / 10   Medication Changes/New allergies or changes in medical history, any new surgeries or procedures?    no  If yes, update Summary List   Subjective Functional Status/Changes:  []  No changes reported   See Eval for subjective information and c/o. OBJECTIVE  8 min Therapeutic Exercise:  [x]  See flow sheet   Rationale:      increase ROM and increase strength to improve the patients ability to perform activities and decrease pain with movement. Billed With/As:   [x] TE   [] TA   [] Neuro   [] Self Care Patient Education: [x] Review HEP    [] Progressed/Changed HEP based on:   [x] positioning   [x] body mechanics   [x] transfers   [] heat/ice application    [] other:      Other Objective/Functional Measures:  HEP emailed to patient via Shanthi Boyer:  Access Code [email or web address]    See Eval     Post Treatment Pain Level (on 0 to 10) scale:   2  / 10     ASSESSMENT  Assessment/Changes in Function:     See Eval     []  See Progress Note/Recertification   Patient will continue to benefit from skilled PT services to modify and progress therapeutic interventions to attain remaining goals. Progress toward goals / Updated goals:  Pt denied sharp pain or red flags with initial eval or therapeutic ex. See initial eval. HEP administered.       PLAN  []  Upgrade activities as tolerated yes Continue plan of care   []  Discharge due to :    []  Other:      Therapist: Rohan Braswell PT    Date: 8/3/2021 Time: 8:40 AM     Future Appointments   Date Time Provider Chari Richardson   9/1/2021  8:05 AM IOC NURSE VISIT [facility]

## 2021-08-11 ENCOUNTER — APPOINTMENT (OUTPATIENT)
Dept: PHYSICAL THERAPY | Age: 61
End: 2021-08-11
Payer: COMMERCIAL

## 2021-08-12 ENCOUNTER — HOSPITAL ENCOUNTER (OUTPATIENT)
Dept: PHYSICAL THERAPY | Age: 61
Discharge: HOME | End: 2021-08-12
Payer: COMMERCIAL

## 2021-08-12 PROCEDURE — 97110 THERAPEUTIC EXERCISES: CPT

## 2021-08-12 PROCEDURE — 97014 ELECTRIC STIMULATION THERAPY: CPT

## 2021-08-12 PROCEDURE — 97140 MANUAL THERAPY 1/> REGIONS: CPT

## 2021-08-12 NOTE — PROGRESS NOTES
PHYSICAL THERAPY - DAILY TREATMENT NOTE    Patient Name: Bailey Monroe        Date: 2021  : 1960   yes Patient  Verified  Visit #:   2   of   8  Insurance: Payor: Leah Mcfadden / Plan: [employer] PPO / Product Type: PPO /      In time: 745 Out time: 830   Total Treatment Time: 45     Medicare/[employer] Time Tracking (below)   Total Timed Codes (min):  na 1:1 Treatment Time:  na     TREATMENT AREA =  Low back pain [M54.5]    SUBJECTIVE  Pain Level (on 0 to 10 scale):  4  / 10   Medication Changes/New allergies or changes in medical history, any new surgeries or procedures?    no  If yes, update Summary List   Subjective Functional Status/Changes:  []  No changes reported     Been doing the HEP but not regularly. OBJECTIVE  Modalities Rationale:     decrease inflammation and decrease pain to improve patient's ability to perform functional mobility activities with decrease c/o symptoms. 10 min [x] Estim, type/location: IFC L/S and upper glute in prone with 2 pillows.                                      []  att     [x]  unatt     []  w/US     [x]  w/ice    []  w/heat    min []  Mechanical Traction: type/lbs                   []  pro   []  sup   []  int   []  cont    []  before manual    []  after manual    min []  Ultrasound, settings/location:      min []  Iontophoresis w/ dexamethasone, location:                                               []  take home patch       []  in clinic    min []  Ice     []  Heat    location/position:     min []  Vasopneumatic Device, press/temp:    If using vaso (only need to measure limb vaso being performed on)      pre-treatment girth :       post-treatment girth :       measured at (landmark location) :      min []  Other:    [x] Skin assessment post-treatment (if applicable):    [x]  intact    []  redness- no adverse reaction                  []redness  adverse reaction:      10 min Therapeutic Exercise:  [x]  See flow sheet   Rationale:      increase ROM, increase strength, improve coordination and improve balance to improve the patients ability to perform functional mobility activities with decrease c/o symptoms. 25 min Manual Therapy: Prone with 2 pillows: STM L/S paraspinals and QL, (B) upper glute region, sacral float   Rationale:      decrease pain, increase ROM and increase tissue extensibility to improve patient's ability to perform functional mobility activities with decrease c/o symptoms. The manual therapy interventions were performed at a separate and distinct time from the therapeutic activities interventions. Billed With/As:   [x] TE   [] TA   [] Neuro   [] Self Care Patient Education: [x] Review HEP    [] Progressed/Changed HEP based on:   [] positioning   [] body mechanics   [] transfers   [] heat/ice application    [] other:      Other Objective/Functional Measures:    No change in functional measurements today. Initiate therx per flow sheet beginning with prone. Post Treatment Pain Level (on 0 to 10) scale:   6  / 10     ASSESSMENT  Assessment/Changes in Function:     Overall good tolerance to all therapeutic interventions for first treatment session. Patient advised of DOMS and to use ice and anti inflammatory prn.     []  See Progress Note/Recertification   Patient will continue to benefit from skilled PT services to modify and progress therapeutic interventions, address functional mobility deficits, address ROM deficits, address strength deficits, analyze and address soft tissue restrictions and analyze and cue movement patterns to attain remaining goals. Progress toward goals / Updated goals:    Short Term Goals: To be accomplished in 4 treatments: 1. Goal: Patient will be independent and compliant with HEP in order to progress toward long term goals. 2. Goal: Patient will demonstrate a +4 on GROC in order to assist with improved QOL    Long Term Goals: To be accomplished in 8 treatments: 1. Goal: Patient will improve FOTO assessment score to 61 pts in order to indicate improved functional abilities. 2.Goal: Patient will demonstrate a 100% pain free bridge in order to assist with lumbopelvic stability for transfers         3. Goal: Patient will demonstrate a negative ASLR on the RLE for 3 consecutive sessions in order to improve neurodynamic mobility and improve ability to perform self stretches           4. Jabari Montenegro will demonstrate increased right hip abduction strength by 1 grade in order to assist with stabilization for prolonged standing activities at work               No change toward goals today.      PLAN  []  Upgrade activities as tolerated yes Continue plan of care   []  Discharge due to :    []  Other:      Therapist: Dee Manning, [facility]    Date: 8/12/2021 Time: 6:56 AM     Future Appointments   Date Time Provider Chari Richardson   8/12/2021  7:45 AM Skylar Sequeira PTA  [address]   8/18/2021  8:30 AM Zander Silver, PT [id number]   8/20/2021  7:00 AM Skylar Sequeira PTA  [address]   8/24/2021  7:45 AM Pattie Lu, PT  [address]   8/27/2021  7:00 AM Skylar Sequeira PTA  [address]   8/30/2021  7:00 AM Evelina Armendariz, PT  [address]   9/1/2021  8:05 AM IO NURSE VISIT [facility] BS AMB   9/2/2021  7:15 AM Pattie Lu, PT [id number]

## 2021-08-18 ENCOUNTER — APPOINTMENT (OUTPATIENT)
Dept: PHYSICAL THERAPY | Age: 61
End: 2021-08-18
Payer: COMMERCIAL

## 2021-08-20 ENCOUNTER — HOSPITAL ENCOUNTER (OUTPATIENT)
Dept: PHYSICAL THERAPY | Age: 61
Discharge: HOME | End: 2021-08-20
Payer: COMMERCIAL

## 2021-08-20 PROCEDURE — 97014 ELECTRIC STIMULATION THERAPY: CPT

## 2021-08-20 PROCEDURE — 97140 MANUAL THERAPY 1/> REGIONS: CPT

## 2021-08-20 PROCEDURE — 97110 THERAPEUTIC EXERCISES: CPT

## 2021-08-20 NOTE — PROGRESS NOTES
PHYSICAL THERAPY - DAILY TREATMENT NOTE    Patient Name: Lynann Severs        Date: 2021  : 1960   yes Patient  Verified  Visit #:   3   of   8  Insurance: Payor: Jm Garcia / Plan: [place] PPO / Product Type: PPO /      In time: 715 Out time: 800   Total Treatment Time: 45     Medicare/[facility] Time Tracking (below)   Total Timed Codes (min):  na 1:1 Treatment Time:  na     TREATMENT AREA =  Low back pain [M54.5]    SUBJECTIVE  Pain Level (on 0 to 10 scale):  2  / 10   Medication Changes/New allergies or changes in medical history, any new surgeries or procedures?    no  If yes, update Summary List   Subjective Functional Status/Changes:  []  No changes reported     Patient arrived 15 minutes late for session. Patient thoiught arrival time was 36. It ws little bit of a rough day after last session. Just alittle soreness and tightness. OBJECTIVE  Modalities Rationale:     decrease inflammation and decrease pain to improve patient's ability to  perform functional mobility activities with decrease c/o symptoms. 10 min [x] Estim, type/location:  IFC L/S and upper glute in prone with 2 pillows.                                           []  att     [x]  unatt     []  w/US     [x]  w/ice    []  w/heat    min []  Mechanical Traction: type/lbs                   []  pro   []  sup   []  int   []  cont    []  before manual    []  after manual    min []  Ultrasound, settings/location:      min []  Iontophoresis w/ dexamethasone, location:                                               []  take home patch       []  in clinic    min []  Ice     []  Heat    location/position:     min []  Vasopneumatic Device, press/temp:    If using vaso (only need to measure limb vaso being performed on)      pre-treatment girth :       post-treatment girth :       measured at (landmark location) :      min []  Other:    [x] Skin assessment post-treatment (if applicable):    [x]  intact    []  redness- no adverse reaction                  []redness  adverse reaction:      20 min Therapeutic Exercise:  [x]  See flow sheet   Rationale:      increase ROM, increase strength, improve coordination and improve balance to improve the patients ability to  perform functional mobility activities with decrease c/o symptoms. 15 min Manual Therapy: Prone with 2 pillows: STM L/S paraspinals and QL, (B) upper glute region, sacral float   Rationale:      decrease pain, increase ROM and increase tissue extensibility to improve patient's ability to  perform functional mobility activities with decrease c/o symptoms. The manual therapy interventions were performed at a separate and distinct time from the therapeutic activities interventions. Billed With/As:   [x] TE   [] TA   [] Neuro   [] Self Care Patient Education: [x] Review HEP    [] Progressed/Changed HEP based on:   [] positioning   [] body mechanics   [] transfers   [] heat/ice application    [] other:      Other Objective/Functional Measures:    PAtient TTP along (L) upper glute region. Post Treatment Pain Level (on 0 to 10) scale:   3  / 10     ASSESSMENT  Assessment/Changes in Function:     Increase therx activities for core strengthening and stretching (B) glutes. []  See Progress Note/Recertification   Patient will continue to benefit from skilled PT services to modify and progress therapeutic interventions, address functional mobility deficits, address ROM deficits, address strength deficits, analyze and address soft tissue restrictions and analyze and cue movement patterns to attain remaining goals. Progress toward goals / Updated goals:    Short Term Goals: To be accomplished in 4 treatments: 1. Goal: Patient will be independent and compliant with HEP in order to progress toward long term goals. Progressing 8/20/21     2.  Goal: Patient will demonstrate a +4 on GROC in order to assist with improved QOL     Long Term Goals: To be accomplished in 8 treatments: 1. Goal: Patient will improve FOTO assessment score to 61 pts in order to indicate improved functional abilities.             2. Goal: Patient will demonstrate a 100% pain free bridge in order to assist with lumbopelvic stability for transfers         3. Goal: Patient will demonstrate a negative ASLR on the RLE for 3 consecutive sessions in order to improve neurodynamic mobility and improve ability to perform self stretches           4. Goal: Patient will demonstrate increased right hip abduction strength by 1 grade in order to assist with stabilization for prolonged standing activities at work       PLAN  []  Upgrade activities as tolerated yes Continue plan of care   []  Discharge due to :    []  Other:      Therapist: Valencia Molina PTA    Date: 8/20/2021 Time: 6:16 AM     Future Appointments   Date Time Provider Chari Richardson   8/20/2021  7:00 AM Ford Penny [facility]   8/24/2021  7:45 AM Dwain Nguyen, PT [facility]   8/27/2021  7:00 AM Gulshan Aponte, PTA [address]   8/30/2021  7:00 AM Jackelyn Toure, PT [address]   9/1/2021  8:05 AM [facility] NURSE VISIT [facility]   9/2/2021  7:15 AM Dwain Nguyen, PT [address]

## 2021-08-24 ENCOUNTER — APPOINTMENT (OUTPATIENT)
Dept: PHYSICAL THERAPY | Age: 61
End: 2021-08-24
Payer: COMMERCIAL

## 2021-08-27 ENCOUNTER — APPOINTMENT (OUTPATIENT)
Dept: PHYSICAL THERAPY | Age: 61
End: 2021-08-27
Payer: COMMERCIAL

## 2021-08-30 ENCOUNTER — APPOINTMENT (OUTPATIENT)
Dept: PHYSICAL THERAPY | Age: 61
End: 2021-08-30
Payer: COMMERCIAL

## 2021-09-02 ENCOUNTER — APPOINTMENT (OUTPATIENT)
Dept: PHYSICAL THERAPY | Age: 61
End: 2021-09-02

## 2021-09-23 ENCOUNTER — OFFICE VISIT (OUTPATIENT)
Dept: INTERNAL MEDICINE CLINIC | Age: 61
End: 2021-09-23
Payer: COMMERCIAL

## 2021-09-23 ENCOUNTER — HOSPITAL ENCOUNTER (OUTPATIENT)
Dept: GENERAL RADIOLOGY | Age: 61
Discharge: HOME | End: 2021-09-23
Payer: COMMERCIAL

## 2021-09-23 VITALS
HEIGHT: 68 IN | DIASTOLIC BLOOD PRESSURE: 48 MMHG | HEART RATE: 74 BPM | OXYGEN SATURATION: 97 % | RESPIRATION RATE: 20 BRPM | WEIGHT: 211 LBS | SYSTOLIC BLOOD PRESSURE: 135 MMHG | BODY MASS INDEX: 31.98 KG/M2 | TEMPERATURE: 97 F

## 2021-09-23 DIAGNOSIS — R59.0 LYMPHADENOPATHY OF HEAD AND NECK REGION: ICD-10-CM

## 2021-09-23 DIAGNOSIS — M25.511 RIGHT SHOULDER PAIN, UNSPECIFIED CHRONICITY: ICD-10-CM

## 2021-09-23 DIAGNOSIS — M54.2 NECK PAIN: ICD-10-CM

## 2021-09-23 DIAGNOSIS — K83.8 COMMON BILE DUCT DILATATION: ICD-10-CM

## 2021-09-23 DIAGNOSIS — M25.511 RIGHT SHOULDER PAIN, UNSPECIFIED CHRONICITY: Primary | ICD-10-CM

## 2021-09-23 DIAGNOSIS — I10 ESSENTIAL HYPERTENSION: ICD-10-CM

## 2021-09-23 DIAGNOSIS — M54.16 LUMBAR RADICULOPATHY, RIGHT: ICD-10-CM

## 2021-09-23 PROCEDURE — 99214 OFFICE O/P EST MOD 30 MIN: CPT | Performed by: INTERNAL MEDICINE

## 2021-09-23 PROCEDURE — 73030 X-RAY EXAM OF SHOULDER: CPT

## 2021-09-23 NOTE — PROGRESS NOTES
Patient is in the office today for neck and bilateral shoulder pain. Patient states she also has been experiencing janis mouth as well. Patient states she is having bilateral numbness and tingling in hands and legs. 1. Have you been to the ER, urgent care clinic since your last visit? Hospitalized since your last visit? No    2. Have you seen or consulted any other health care providers outside of the [address] [place] [address] since your last visit? Include any pap smears or colon screening.  No

## 2021-09-23 NOTE — PROGRESS NOTES
HPI     Chasidy Vallejo returns after epidural injections for her low back pain. This has significantly helped her condition. She says she has had neck and shoulder pain for a while, but this was eclipsed by her low back pain. She describes severe pain in the right shoulder, and some tingling and numbness. She had stopped taking the gabapentin, since it did not seem to help her low back pain. She is willing to retry it for neck pain to figure out how much of it is radicular. She was unaware of the order for a CT of neck soft tissue to follow-up cervical adenopathy. She has an appointment to see GI for her mildly dilated common bile duct, and for mildly elevated alkaline phosphatase. Past Medical History:   Diagnosis Date    Bilateral numbness and tingling of arms and legs     Essential hypertension     Hypercholesterolemia         No past surgical history on file. Current Outpatient Medications   Medication Sig Dispense Refill    pravastatin (PRAVACHOL) 20 mg tablet 1 p.o. daily 90 Tablet 1    losartan (COZAAR) 100 mg tablet       metoprolol succinate (TOPROL-XL) 25 mg XL tablet       amLODIPine (NORVASC) 10 mg tablet Take 1 Tablet by mouth daily.  90 Tablet 3    gabapentin (NEURONTIN) 300 mg capsule 1 po bid-- new dose, warning sedating 180 Capsule 1    diazePAM (VALIUM) 2 mg tablet 1 or 2 po q 12 hrs PRN anxiety (Patient not taking: Reported on 2021) 10 Tablet 0        No Known Allergies     Social History     Socioeconomic History    Marital status:      Spouse name: Not on file    Number of children: Not on file    Years of education: Not on file    Highest education level: Not on file   Occupational History    Not on file   Tobacco Use    Smoking status: Former Smoker     Packs/day: 0.25     Years: 20.00     Pack years: 5.00     Quit date:      Years since quittin.7    Smokeless tobacco: Never Used   Vaping Use    Vaping Use: Never used   Substance and Sexual Activity    Alcohol use: No    Drug use: No    Sexual activity: Not on file   Other Topics Concern    Not on file   Social History Narrative    Not on file     Social Determinants of Health     Financial Resource Strain:     Difficulty of Paying Living Expenses:    Food Insecurity:     Worried About Running Out of Food in the Last Year:     [address] in the Last Year:    Transportation Needs:     Lack of Transportation (Medical):  Lack of Transportation (Non-Medical):    Physical Activity:     Days of Exercise per Week:     Minutes of Exercise per Session:    Stress:     Feeling of Stress :    Social Connections:     Frequency of Communication with Friends and Family:     Frequency of Social Gatherings with Friends and Family:     Attends [employer] Services:     Active Member of Clubs or Organizations:     Attends Club or Organization Meetings:     Marital Status:    Intimate Partner Violence:     Fear of Current or Ex-Partner:     Emotionally Abused:     Physically Abused:     Sexually Abused:         No family history on file. Visit Vitals  BP (!) 135/48 (BP 1 Location: Left arm, BP Patient Position: Sitting, BP Cuff Size: Adult)   Pulse 74   Temp 97 °F (36.1 °C) (Temporal)   Resp 20   Ht 5' 8\" (1.727 m)   Wt 211 lb (95.7 kg)   SpO2 97%   BMI 32.08 kg/m²        Review of Systems   Constitutional: Negative for chills and fever. Positive for night sweats, not drenching. Respiratory: Negative for shortness of breath. Cardiovascular: Negative for chest pain. Gastrointestinal: Negative for blood in stool. Genitourinary: Negative for hematuria. Musculoskeletal: Positive for joint pain and neck pain. Neurological: Negative for tingling. Physical Exam  Constitutional:       General: She is in acute distress. Appearance: She is obese. Eyes:      General: No scleral icterus.      Conjunctiva/sclera: Conjunctivae normal.   Neck:      Comments: Carotid upstrokes normal to palpation. Lymph: No cervical, supraclavicular, or axillary lymphadenopathy. Cardiovascular:      Rate and Rhythm: Normal rate and regular rhythm. Pulses: Normal pulses. Heart sounds: No friction rub. No gallop. Pulmonary:      Effort: Pulmonary effort is normal.      Breath sounds: Normal breath sounds. Musculoskeletal:      Cervical back: Neck supple. Comments: No clubbing, cyanosis, or edema. There is right shoulder pain with passive joint range of motion, positive for biceps tendon tenderness, positive for pain on arm abduction over about 30 degrees. Positive for pain with reaching behind her back with the right upper extremity. Skin:     General: Skin is warm and dry. Neurological:      Mental Status: She is alert and oriented to person, place, and time. Psychiatric:         Mood and Affect: Mood normal.                  Diagnoses and all orders for this visit:    1. Right shoulder pain, unspecified chronicity  Comments:  Could be due to cervical radiculopathy and/or loop of vertebral artery impinging on C4-5 neural foramen. Some evidence shoulder DJD. Check x-ray    2. Neck pain  Comments:  Reviewed cervical MRI, showing facet arthritis, DDD, some annular tears. Recommend resume gabapentin 300 mg nightly, follow-up on pain of neck and arm region n    3. Essential hypertension  Comments:  Trolled, continue medications. 4. Lymphadenopathy of head and neck region  Comments:  Patient was unaware of order for CT of neckat next visit, make sure scheduled to follow-up on right cervical adenopathy. 5. Common bile duct dilatation  Comments:  Seen incidentally on MRI of lumbar spine. Slightly elevated alkaline phosphatase. Has upcoming GI appointment. 6. Lumbar radiculopathy, right  Comments:  Significantly improved with epidurals by pain management. Follow-up and Dispositions    · Return in about 7 weeks (around 11/11/2021). Matthias Persaud MD

## 2021-09-23 NOTE — PATIENT INSTRUCTIONS
Neck Pain: Care Instructions  Your Care Instructions     You can have neck pain anywhere from the bottom of your head to the top of your shoulders. It can spread to the upper back or arms. Injuries, painting a ceiling, sleeping with your neck twisted, staying in one position for too long, and many other activities can cause neck pain. Most neck pain gets better with home care. Your doctor may recommend medicine to relieve pain or relax your muscles. He or she may suggest exercise and physical therapy to increase flexibility and relieve stress. You may need to wear a special (cervical) collar to support your neck for a day or two. Follow-up care is a key part of your treatment and safety. Be sure to make and go to all appointments, and call your doctor if you are having problems. It's also a good idea to know your test results and keep a list of the medicines you take. How can you care for yourself at home? · Try using a heating pad on a low or medium setting for 15 to 20 minutes every 2 or 3 hours. Try a warm shower in place of one session with the heating pad. · You can also try an ice pack for 10 to 15 minutes every 2 to 3 hours. Put a thin cloth between the ice and your skin. · Take pain medicines exactly as directed. ? If the doctor gave you a prescription medicine for pain, take it as prescribed. ? If you are not taking a prescription pain medicine, ask your doctor if you can take an over-the-counter medicine. · If your doctor recommends a cervical collar, wear it exactly as directed. When should you call for help? Call your doctor now or seek immediate medical care if:    · You have new or worsening numbness in your arms, buttocks or legs.     · You have new or worsening weakness in your arms or legs. (This could make it hard to stand up.)     · You lose control of your bladder or bowels.    Watch closely for changes in your health, and be sure to contact your doctor if:    · Your neck pain is getting worse.     · You are not getting better after 1 week.     · You do not get better as expected. Where can you learn more? Go to http://www.[employer].com/  Enter V723 in the search box to learn more about \"Neck Pain: Care Instructions. \"  Current as of: July 1, 2021               Content Version: 13.0  © 4348-5959 [employer]. Care instructions adapted under license by [employer] (which disclaims liability or warranty for this information). If you have questions about a medical condition or this instruction, always ask your healthcare professional. [place] [address] any warranty or liability for your use of this information.

## 2021-09-27 ENCOUNTER — TELEPHONE (OUTPATIENT)
Dept: INTERNAL MEDICINE CLINIC | Age: 61
End: 2021-09-27

## 2021-09-27 NOTE — TELEPHONE ENCOUNTER
Shoulder x-ray shows a large bone spur, could be causing her pain. Let us see how she does back on the gabapentin in terms of her neck and shoulder pain.

## 2021-09-28 DIAGNOSIS — E78.00 HYPERCHOLESTEROLEMIA: ICD-10-CM

## 2021-10-01 ENCOUNTER — TELEPHONE (OUTPATIENT)
Dept: INTERNAL MEDICINE CLINIC | Age: 61
End: 2021-10-01

## 2021-10-01 DIAGNOSIS — M25.511 RIGHT SHOULDER PAIN, UNSPECIFIED CHRONICITY: Primary | ICD-10-CM

## 2021-10-01 RX ORDER — TRAMADOL HYDROCHLORIDE 50 MG/1
50 TABLET ORAL
Qty: 40 TABLET | Refills: 0 | Status: SHIPPED | OUTPATIENT
Start: 2021-10-01 | End: 2021-10-11

## 2021-10-01 NOTE — TELEPHONE ENCOUNTER
Returned patient's call 10/1/2021 at 4:50 PM    Patient with persistent pain in shoulders and neck, sensation that the area is swollen, tried twice daily gabapentin but made her drowsy, so only taking 1 nightly.   Recommend add topical such as Biofreeze, increase gabapentin to twice daily over the weekend, and prescription sent for tramadol 50 mg 1 every 6 hours as needed pain #40 no refills

## 2021-10-01 NOTE — TELEPHONE ENCOUNTER
Pt called asking to speak with Dr. Myriam Campos, said she did start gabapentin after last ov    She is in a lot of neck & shoulder pain and is leaving work early today because of it

## 2021-10-08 DIAGNOSIS — R59.0 LYMPHADENOPATHY OF HEAD AND NECK REGION: ICD-10-CM

## 2021-10-18 NOTE — PROGRESS NOTES
[facility] THERAPY  [address] [place] [address] 201,[facility], [facility] - Phone: [phone]  Fax: [phone]    DISCHARGE NOTE  Patient Name: Cornelia Pratt : 1960   Treatment/Medical Diagnosis: Low back pain [M54.5]   Referral Source: Serge Lemus DO     Date of Initial Visit: 8/3/2021 Attended Visits: 3 Missed Visits: 1       SUMMARY OF TREATMENT  Pt attended only initial evaluation and     2     follow-ups and then did not return. Therefore a formal reassessment of goals was not performed. RECOMMENDATIONS  Discontinue physical therapy due to patient not returning. If you have any questions/comments please contact us directly at [phone]. Thank you for allowing us to assist in the care of your patient.     Therapist Signature: Keenan Jones PTA Date: 10/18/21    Danitza Valera PT, DPT   Time: 8:23 AM

## 2021-11-08 ENCOUNTER — OFFICE VISIT (OUTPATIENT)
Dept: INTERNAL MEDICINE CLINIC | Age: 61
End: 2021-11-08
Payer: COMMERCIAL

## 2021-11-08 VITALS
RESPIRATION RATE: 17 BRPM | SYSTOLIC BLOOD PRESSURE: 131 MMHG | OXYGEN SATURATION: 98 % | HEIGHT: 68 IN | TEMPERATURE: 97.3 F | HEART RATE: 61 BPM | DIASTOLIC BLOOD PRESSURE: 61 MMHG | BODY MASS INDEX: 31.77 KG/M2 | WEIGHT: 209.6 LBS

## 2021-11-08 DIAGNOSIS — I10 ESSENTIAL HYPERTENSION: ICD-10-CM

## 2021-11-08 DIAGNOSIS — R59.0 CERVICAL LYMPHADENOPATHY: ICD-10-CM

## 2021-11-08 DIAGNOSIS — E78.00 HYPERCHOLESTEROLEMIA: ICD-10-CM

## 2021-11-08 DIAGNOSIS — M54.12 RIGHT CERVICAL RADICULOPATHY: Primary | ICD-10-CM

## 2021-11-08 PROCEDURE — 99214 OFFICE O/P EST MOD 30 MIN: CPT | Performed by: INTERNAL MEDICINE

## 2021-11-08 RX ORDER — LOSARTAN POTASSIUM 100 MG/1
TABLET ORAL
Qty: 90 TABLET | Refills: 3 | Status: SHIPPED | OUTPATIENT
Start: 2021-11-08

## 2021-11-08 RX ORDER — METOPROLOL SUCCINATE 25 MG/1
TABLET, EXTENDED RELEASE ORAL
Qty: 90 TABLET | Refills: 3 | Status: SHIPPED | OUTPATIENT
Start: 2021-11-08

## 2021-11-08 NOTE — PROGRESS NOTES
Hu Penny female 57 y.o.       1. Have you been to the ER, urgent care clinic since your last visit? Hospitalized since your last visit? No    2. Have you seen or consulted any other health care providers outside of the [address] [place] [address] since your last visit? Include any pap smears or colon screening.  No

## 2021-11-08 NOTE — PROGRESS NOTES
BEN Tobias returns for follow-up of her right neck and arm pain and tingling. She is unable to take gabapentin during the day due to drowsiness, so is taking a nighttime dose without significant effect on her pain. She was not aware that she was to have a CT scan of her neck done for cervical lymphadenopathy seen on cervical spine MRI. She is concerned that she has swelling in bilateral supraclavicular areas. She has not had her lipid panel checked yet. She stopped taking the amlodipine due to bilateral lower extremity edema, which has now resolved. The epidurals to her low back continue to keep her back pain at bay. Past Medical History:   Diagnosis Date    Bilateral numbness and tingling of arms and legs     Essential hypertension     Hypercholesterolemia         No past surgical history on file. Current Outpatient Medications   Medication Sig Dispense Refill    losartan (COZAAR) 100 mg tablet 1 po dialy 90 Tablet 3    metoprolol succinate (TOPROL-XL) 25 mg XL tablet 1 po daily 90 Tablet 3    pravastatin (PRAVACHOL) 20 mg tablet 1 p.o. daily 90 Tablet 1    gabapentin (NEURONTIN) 300 mg capsule 1 po bid-- new dose, warning sedating 180 Capsule 1    diazePAM (VALIUM) 2 mg tablet 1 or 2 po q 12 hrs PRN anxiety (Patient not taking: Reported on 2021) 10 Tablet 0        Allergies   Allergen Reactions    Amlodipine Other (comments)     Bilateral lower extremity edema.         Social History     Socioeconomic History    Marital status:      Spouse name: Not on file    Number of children: Not on file    Years of education: Not on file    Highest education level: Not on file   Occupational History    Not on file   Tobacco Use    Smoking status: Former Smoker     Packs/day: 0.25     Years: 20.00     Pack years: 5.00     Quit date:      Years since quittin.8    Smokeless tobacco: Never Used   Vaping Use    Vaping Use: Never used   Substance and Sexual Activity  Alcohol use: No    Drug use: No    Sexual activity: Not on file   Other Topics Concern    Not on file   Social History Narrative    Not on file     Social Determinants of Health     Financial Resource Strain:     Difficulty of Paying Living Expenses: Not on file   Food Insecurity:     Worried About Running Out of Food in the Last Year: Not on file    Jennifer of Food in the Last Year: Not on file   Transportation Needs:     Lack of Transportation (Medical): Not on file    Lack of Transportation (Non-Medical): Not on file   Physical Activity:     Days of Exercise per Week: Not on file    Minutes of Exercise per Session: Not on file   Stress:     Feeling of Stress : Not on file   Social Connections:     Frequency of Communication with Friends and Family: Not on file    Frequency of Social Gatherings with Friends and Family: Not on file    Attends [employer] Services: Not on file    Active Member of [address] [place] [address] [employer] Job or Organizations: Not on file    Attends Club or Organization Meetings: Not on file    Marital Status: Not on file   Intimate Partner Violence:     Fear of Current or Ex-Partner: Not on file    Emotionally Abused: Not on file    Physically Abused: Not on file    Sexually Abused: Not on file   Housing Stability:     Unable to Pay for Housing in the Last Year: Not on file    Number of [place] in the Last Year: Not on file    Unstable Housing in the Last Year: Not on file        No family history on file. Visit Vitals  /61 (BP 1 Location: Left arm, BP Patient Position: Sitting, BP Cuff Size: Adult)   Pulse 61   Temp 97.3 °F (36.3 °C) (Temporal)   Resp 17   Ht 5' 8\" (1.727 m)   Wt 209 lb 9.6 oz (95.1 kg)   SpO2 98%   BMI 31.87 kg/m²        Review of Systems   Constitutional: Positive for diaphoresis. Negative for chills and fever. Eyes: Negative for blurred vision. Respiratory:        Positive for dyspnea on exertion only when wearing mask.    Cardiovascular: Negative for chest pain, orthopnea and PND. Gastrointestinal: Negative for blood in stool. Genitourinary: Negative for hematuria. Musculoskeletal: Positive for neck pain. Physical Exam  Constitutional:       Appearance: She is not ill-appearing. Neck:      Comments: Supple, normal carotid upstrokes to palpation. Lymph: No cervical or supraclavicular lymphadenopathy noted. Cardiovascular:      Rate and Rhythm: Normal rate and regular rhythm. Pulses: Normal pulses. Heart sounds: No murmur heard. No friction rub. No gallop. Pulmonary:      Effort: Pulmonary effort is normal.      Breath sounds: Normal breath sounds. Abdominal:      General: Abdomen is flat. Palpations: Abdomen is soft. Tenderness: There is no abdominal tenderness. Musculoskeletal:      Cervical back: Neck supple. Comments: Right shoulder not tender to palpation. Bilateral upper extremity strength is normal.   Skin:     General: Skin is warm and dry. Neurological:      Mental Status: She is alert and oriented to person, place, and time. Comments: Neck flexion reproduces pain and numbness in the right arm, with a sensation of pulling in her shoulder. Psychiatric:         Mood and Affect: Mood normal.                  Diagnoses and all orders for this visit:    1. Right cervical radiculopathy  Comments:  MRI shows annular tear at C6-7, also a loop of vertebral artery entering right neural foramen at C5-6. Intolerant higher doses gabapentin, refer to vascular. 2. Essential hypertension  Comments:  Controlled despite being off amlodipine. Medications refilled. Orders:  -     losartan (COZAAR) 100 mg tablet; 1 po dialy  -     metoprolol succinate (TOPROL-XL) 25 mg XL tablet; 1 po daily    3. Hypercholesterolemia  Comments:  Reminded to have labs drawn between now and next visit. On statin. 4. Cervical lymphadenopathy  Comments:  Seen on MRI of cervical spine, will reorder soft tissue CT of neck. Follow-up and Dispositions    · Return in about 3 months (around 2/8/2022) for 30 minuate physical-- do labs a week or two before-- [place] order.               Neva Rand MD

## 2021-11-18 ENCOUNTER — TELEPHONE (OUTPATIENT)
Dept: INTERNAL MEDICINE CLINIC | Age: 61
End: 2021-11-18

## 2021-11-18 DIAGNOSIS — M25.511 RIGHT SHOULDER PAIN, UNSPECIFIED CHRONICITY: Primary | ICD-10-CM

## 2021-11-18 DIAGNOSIS — R59.0 CERVICAL LYMPHADENOPATHY: ICD-10-CM

## 2021-11-18 NOTE — TELEPHONE ENCOUNTER
Patient stating Dr. Christiane Kanner was going to refer her either to vascular or neuro for neck and shoulder pain and she is following up because she has not heard from anyone. Stating he has been having headaches for the past couple of days and today it is really bad. She doesn't know if that is related or not but needs something for the pain.

## 2021-11-18 NOTE — TELEPHONE ENCOUNTER
Referral on printer to see Dr. Chrissy Lawton. Where would she like the Ct scan of her neck done ( to check enlarged lymph nodes)?

## 2021-11-19 RX ORDER — HYDROCODONE BITARTRATE AND ACETAMINOPHEN 5; 325 MG/1; MG/1
1 TABLET ORAL
Qty: 28 TABLET | Refills: 0 | Status: SHIPPED | OUTPATIENT
Start: 2021-11-19 | End: 2021-11-26

## 2021-11-19 NOTE — TELEPHONE ENCOUNTER
Patient advised referral will be faxed to Dr. Rodriguez Rom office and they will contact her to schedule appt. Patient wants to have the MRI done here in [place]. Shewants to know if something can be sent in to [facility] for the pain.

## 2021-11-19 NOTE — TELEPHONE ENCOUNTER
Patient requesting medication for severe pain left neck and shoulder.   Hydrocodone/acetaminophen 5/325 mg 1 p.o. every 6 hours as needed pain for up to 7 days #28

## 2021-12-03 DIAGNOSIS — R59.0 CERVICAL LYMPHADENOPATHY: ICD-10-CM

## 2021-12-15 ENCOUNTER — HOSPITAL ENCOUNTER (OUTPATIENT)
Dept: CT IMAGING | Age: 61
Discharge: HOME | End: 2021-12-15
Attending: INTERNAL MEDICINE
Payer: COMMERCIAL

## 2021-12-15 LAB — CREAT UR-MCNC: 0.7 MG/DL (ref 0.6–1.3)

## 2021-12-15 PROCEDURE — 70491 CT SOFT TISSUE NECK W/DYE: CPT

## 2021-12-15 PROCEDURE — 74011000636 HC RX REV CODE- 636: Performed by: INTERNAL MEDICINE

## 2021-12-15 PROCEDURE — 82565 ASSAY OF CREATININE: CPT

## 2021-12-15 RX ADMIN — IOPAMIDOL 80 ML: 612 INJECTION, SOLUTION INTRAVENOUS at 16:26

## 2021-12-16 ENCOUNTER — TELEPHONE (OUTPATIENT)
Dept: INTERNAL MEDICINE CLINIC | Age: 61
End: 2021-12-16

## 2021-12-16 DIAGNOSIS — E04.1 RIGHT THYROID NODULE: Primary | ICD-10-CM

## 2021-12-16 NOTE — TELEPHONE ENCOUNTER
Patient returned call and does want to have the [address] done. Please order.

## 2021-12-16 NOTE — TELEPHONE ENCOUNTER
Let her know there are no abnormal lymph nodes on the CT scan of her neck. She does have a small thyroid nodule, which she like to go ahead and have me order an ultrasound of that to further evaluate, or which she would like to discuss that at her visit in February?

## 2021-12-20 ENCOUNTER — TELEPHONE (OUTPATIENT)
Dept: INTERNAL MEDICINE CLINIC | Age: 61
End: 2021-12-20

## 2021-12-20 NOTE — TELEPHONE ENCOUNTER
Left name and call back number. The call was to inform patient that the [facility] is trying to contact her for an appointment.  She can contact them at [phone] option #2

## 2022-01-03 ENCOUNTER — TRANSCRIBE ORDER (OUTPATIENT)
Dept: SCHEDULING | Age: 62
End: 2022-01-03

## 2022-01-03 DIAGNOSIS — I77.1 STRICTURE OF ARTERY (HCC): Primary | ICD-10-CM

## 2022-01-10 ENCOUNTER — HOSPITAL ENCOUNTER (OUTPATIENT)
Dept: CT IMAGING | Age: 62
Discharge: HOME | End: 2022-01-10
Attending: SURGERY
Payer: COMMERCIAL

## 2022-01-10 DIAGNOSIS — I77.1 STRICTURE OF ARTERY (HCC): ICD-10-CM

## 2022-01-10 LAB — CREAT UR-MCNC: 0.8 MG/DL (ref 0.6–1.3)

## 2022-01-10 PROCEDURE — 70498 CT ANGIOGRAPHY NECK: CPT

## 2022-01-10 PROCEDURE — 74011000636 HC RX REV CODE- 636: Performed by: SURGERY

## 2022-01-10 PROCEDURE — 82565 ASSAY OF CREATININE: CPT

## 2022-01-10 RX ADMIN — IOPAMIDOL 80 ML: 755 INJECTION, SOLUTION INTRAVENOUS at 16:25

## 2022-01-16 DIAGNOSIS — E04.1 RIGHT THYROID NODULE: ICD-10-CM

## 2022-01-18 ENCOUNTER — TELEPHONE (OUTPATIENT)
Dept: INTERNAL MEDICINE CLINIC | Age: 62
End: 2022-01-18

## 2022-01-18 DIAGNOSIS — M54.2 NECK PAIN: Primary | ICD-10-CM

## 2022-01-18 RX ORDER — HYDROCODONE BITARTRATE AND ACETAMINOPHEN 5; 325 MG/1; MG/1
1 TABLET ORAL
Qty: 28 TABLET | Refills: 0 | Status: SHIPPED | OUTPATIENT
Start: 2022-01-18 | End: 2022-01-25

## 2022-01-18 NOTE — TELEPHONE ENCOUNTER
Severe right neck pain persists, has seen vascular physician, CTA of neck performed.   Pending spine specialist visit, patient requests Norco, sent for 5/325 1 every 6 hours as needed #28 no refills

## 2022-01-18 NOTE — TELEPHONE ENCOUNTER
Pt asking please send in pain medication refill of:   HYDROcodone-acetaminophen (NORCO) 5-325 mg per tablet for neck pain    Stated she is in so much pain yesterday and today. She went to work today but is going home now due to pain.  She did not work yesterday     Stated Dr Alexia Preciado has referred her to a spine specialist and between getting orders for testing, nothing has been diagnosed as of yet     [facility]

## 2022-02-01 ENCOUNTER — TRANSCRIBE ORDER (OUTPATIENT)
Dept: SCHEDULING | Age: 62
End: 2022-02-01

## 2022-02-01 DIAGNOSIS — M54.12 CERVICAL RADICULOPATHY: Primary | ICD-10-CM

## 2022-02-11 ENCOUNTER — HOSPITAL ENCOUNTER (OUTPATIENT)
Age: 62
Discharge: HOME | End: 2022-02-11
Attending: PHYSICIAN ASSISTANT
Payer: COMMERCIAL

## 2022-02-11 DIAGNOSIS — M54.12 CERVICAL RADICULOPATHY: ICD-10-CM

## 2022-02-11 PROCEDURE — A9575 INJ GADOTERATE MEGLUMI 0.1ML: HCPCS | Performed by: PHYSICIAN ASSISTANT

## 2022-02-11 PROCEDURE — 72156 MRI NECK SPINE W/O & W/DYE: CPT

## 2022-02-11 PROCEDURE — 74011250636 HC RX REV CODE- 250/636: Performed by: PHYSICIAN ASSISTANT

## 2022-02-11 RX ADMIN — GADOTERATE MEGLUMINE 19 ML: 376.9 INJECTION INTRAVENOUS at 16:20

## 2022-02-14 ENCOUNTER — TRANSCRIBE ORDER (OUTPATIENT)
Dept: SCHEDULING | Age: 62
End: 2022-02-14

## 2022-02-14 DIAGNOSIS — Z12.31 SCREENING MAMMOGRAM FOR HIGH-RISK PATIENT: Primary | ICD-10-CM

## 2022-07-25 ENCOUNTER — PATIENT MESSAGE (OUTPATIENT)
Dept: INTERNAL MEDICINE CLINIC | Age: 62
End: 2022-07-25

## 2022-08-22 ENCOUNTER — TELEPHONE (OUTPATIENT)
Dept: MAMMOGRAPHY | Age: 62
End: 2022-08-22

## 2023-01-30 DIAGNOSIS — Z12.31 SCREENING MAMMOGRAM FOR HIGH-RISK PATIENT: Primary | ICD-10-CM

## 2023-02-03 DIAGNOSIS — Z12.31 SCREENING MAMMOGRAM FOR HIGH-RISK PATIENT: Primary | ICD-10-CM

## 2023-05-12 ENCOUNTER — TELEPHONE (OUTPATIENT)
Age: 63
End: 2023-05-12

## 2023-05-15 DIAGNOSIS — I10 ESSENTIAL (PRIMARY) HYPERTENSION: Primary | ICD-10-CM

## 2023-05-15 RX ORDER — METOPROLOL SUCCINATE 25 MG/1
TABLET, EXTENDED RELEASE ORAL
Qty: 90 TABLET | Refills: 0 | Status: SHIPPED | OUTPATIENT
Start: 2023-05-15

## 2023-05-15 RX ORDER — LOSARTAN POTASSIUM 100 MG/1
TABLET ORAL
Qty: 90 TABLET | Refills: 0 | Status: SHIPPED | OUTPATIENT
Start: 2023-05-15

## 2023-08-14 SDOH — ECONOMIC STABILITY: FOOD INSECURITY: WITHIN THE PAST 12 MONTHS, THE FOOD YOU BOUGHT JUST DIDN'T LAST AND YOU DIDN'T HAVE MONEY TO GET MORE.: NEVER TRUE

## 2023-08-14 SDOH — ECONOMIC STABILITY: FOOD INSECURITY: WITHIN THE PAST 12 MONTHS, YOU WORRIED THAT YOUR FOOD WOULD RUN OUT BEFORE YOU GOT MONEY TO BUY MORE.: NEVER TRUE

## 2023-08-14 SDOH — ECONOMIC STABILITY: TRANSPORTATION INSECURITY
IN THE PAST 12 MONTHS, HAS LACK OF TRANSPORTATION KEPT YOU FROM MEETINGS, WORK, OR FROM GETTING THINGS NEEDED FOR DAILY LIVING?: NO

## 2023-08-14 SDOH — ECONOMIC STABILITY: INCOME INSECURITY: HOW HARD IS IT FOR YOU TO PAY FOR THE VERY BASICS LIKE FOOD, HOUSING, MEDICAL CARE, AND HEATING?: NOT VERY HARD

## 2023-08-14 SDOH — ECONOMIC STABILITY: HOUSING INSECURITY
IN THE LAST 12 MONTHS, WAS THERE A TIME WHEN YOU DID NOT HAVE A STEADY PLACE TO SLEEP OR SLEPT IN A SHELTER (INCLUDING NOW)?: NO

## 2023-08-15 ENCOUNTER — OFFICE VISIT (OUTPATIENT)
Age: 63
End: 2023-08-15
Payer: COMMERCIAL

## 2023-08-15 VITALS
TEMPERATURE: 98.2 F | OXYGEN SATURATION: 99 % | BODY MASS INDEX: 30.46 KG/M2 | SYSTOLIC BLOOD PRESSURE: 158 MMHG | HEIGHT: 68 IN | HEART RATE: 63 BPM | RESPIRATION RATE: 20 BRPM | DIASTOLIC BLOOD PRESSURE: 63 MMHG | WEIGHT: 201 LBS

## 2023-08-15 DIAGNOSIS — N28.1 RENAL CYST, RIGHT: ICD-10-CM

## 2023-08-15 DIAGNOSIS — R51.9 RIGHT-SIDED HEADACHE: ICD-10-CM

## 2023-08-15 DIAGNOSIS — I10 ESSENTIAL (PRIMARY) HYPERTENSION: Primary | ICD-10-CM

## 2023-08-15 DIAGNOSIS — E78.00 HYPERCHOLESTEROLEMIA: ICD-10-CM

## 2023-08-15 DIAGNOSIS — E04.1 THYROID NODULE: ICD-10-CM

## 2023-08-15 PROCEDURE — 3074F SYST BP LT 130 MM HG: CPT | Performed by: INTERNAL MEDICINE

## 2023-08-15 PROCEDURE — 99214 OFFICE O/P EST MOD 30 MIN: CPT | Performed by: INTERNAL MEDICINE

## 2023-08-15 PROCEDURE — 3078F DIAST BP <80 MM HG: CPT | Performed by: INTERNAL MEDICINE

## 2023-08-15 RX ORDER — METOPROLOL SUCCINATE 50 MG/1
50 TABLET, EXTENDED RELEASE ORAL DAILY
Qty: 90 TABLET | Refills: 1 | Status: SHIPPED | OUTPATIENT
Start: 2023-08-15

## 2023-08-15 RX ORDER — LOSARTAN POTASSIUM 100 MG/1
TABLET ORAL
Qty: 90 TABLET | Refills: 1 | Status: SHIPPED | OUTPATIENT
Start: 2023-08-15

## 2023-08-15 ASSESSMENT — PATIENT HEALTH QUESTIONNAIRE - PHQ9
1. LITTLE INTEREST OR PLEASURE IN DOING THINGS: 0
SUM OF ALL RESPONSES TO PHQ QUESTIONS 1-9: 0
2. FEELING DOWN, DEPRESSED OR HOPELESS: 0
SUM OF ALL RESPONSES TO PHQ QUESTIONS 1-9: 0
SUM OF ALL RESPONSES TO PHQ9 QUESTIONS 1 & 2: 0

## 2023-08-16 ASSESSMENT — ENCOUNTER SYMPTOMS
SHORTNESS OF BREATH: 0
NAUSEA: 1
BLOOD IN STOOL: 0

## 2023-08-16 NOTE — PROGRESS NOTES
Mary Barclay presents today for   Chief Complaint   Patient presents with    Follow-up Chronic Condition                 1. \"Have you been to the ER, urgent care clinic since your last visit? Hospitalized since your last visit? \" no    2. \"Have you seen or consulted any other health care providers outside of the [address] [place] [address] since your last visit? \" no     3. For patients aged 43-73: Has the patient had a colonoscopy / FIT/ Cologuard? Yes - no Care Gap present      If the patient is female:    4. For patients aged 43-66: Has the patient had a mammogram within the past 2 years? No      5. For patients aged 21-65: Has the patient had a pap smear?  No
visit  Orders:  -     metoprolol succinate (TOPROL XL) 50 MG extended release tablet; Take 1 tablet by mouth daily  -     losartan (COZAAR) 100 MG tablet; 1 po dialy  -     Comprehensive Metabolic Panel; Future  -     Urinalysis with Microscopic; Future    Right-sided headache  Comments:  suggestive of a migraine, see if increase in beta-blocker helps, add additional prophylactic medication if BP improves but headache persists. Orders:  -     metoprolol succinate (TOPROL XL) 50 MG extended release tablet; Take 1 tablet by mouth daily  -     CBC with Auto Differential; Future    Renal cyst, right  Comments:  Seen on lumbar MRI, check retroperitoneal ultrasound  Orders:  -     US RETROPERITONEAL LIMITED; Future    Thyroid nodule  Comments:  Seen on cervical MRI, check thyroid ultrasound  Orders:  -     US HEAD NECK SOFT TISSUE THYROID; Future    Hypercholesterolemia  Comments:  Check fasting panel with next labs  Orders:  -     Lipid Panel; Future  -     Comprehensive Metabolic Panel; Future         No follow-up provider specified.          Raliegh Brittle, MD

## 2023-09-01 ENCOUNTER — HOSPITAL ENCOUNTER (OUTPATIENT)
Facility: HOSPITAL | Age: 63
End: 2023-09-01
Attending: INTERNAL MEDICINE
Payer: COMMERCIAL

## 2023-09-01 ENCOUNTER — HOSPITAL ENCOUNTER (OUTPATIENT)
Facility: HOSPITAL | Age: 63
Discharge: HOME | End: 2023-09-01
Attending: INTERNAL MEDICINE
Payer: COMMERCIAL

## 2023-09-01 DIAGNOSIS — N28.1 RENAL CYST, RIGHT: ICD-10-CM

## 2023-09-01 DIAGNOSIS — E04.1 THYROID NODULE: ICD-10-CM

## 2023-09-01 PROCEDURE — 76770 US EXAM ABDO BACK WALL COMP: CPT

## 2023-09-01 PROCEDURE — 76536 US EXAM OF HEAD AND NECK: CPT

## 2023-09-06 ENCOUNTER — TELEPHONE (OUTPATIENT)
Age: 63
End: 2023-09-06

## 2023-09-06 NOTE — TELEPHONE ENCOUNTER
Ultrasound of her kidneys does not demonstrate the cyst seen on scan a few years ago, probably because of its small size. Thyroid ultrasound shows bilateral small nodules, none are suspicious, none meet criteria for biopsy.   Consider rechecking ultrasound in about 1 year

## 2023-09-08 NOTE — TELEPHONE ENCOUNTER
Unable to leave name and callback number. Sent message to patient's [employer]     Ultrasound of her kidneys does not demonstrate the cyst seen on scan a few years ago, probably because of its small size. Thyroid ultrasound shows bilateral small nodules, none are suspicious, none meet criteria for biopsy.   Consider rechecking ultrasound in about 1 year

## 2023-09-14 ENCOUNTER — HOSPITAL ENCOUNTER (OUTPATIENT)
Facility: HOSPITAL | Age: 63
Discharge: HOME | End: 2023-09-17

## 2023-09-14 LAB — SENTARA SPECIMEN COLLECTION: NORMAL

## 2023-09-15 LAB
A/G RATIO: 1.5 RATIO (ref 1.1–2.6)
ALBUMIN SERPL-MCNC: 4.2 G/DL (ref 3.5–5)
ALP BLD-CCNC: 148 U/L (ref 40–120)
ALT SERPL-CCNC: 21 U/L (ref 5–40)
ANION GAP SERPL CALCULATED.3IONS-SCNC: 10 MMOL/L (ref 3–15)
AST SERPL-CCNC: 18 U/L (ref 10–37)
BACTERIA: NEGATIVE
BASOPHILS # BLD: 1 % (ref 0–2)
BASOPHILS ABSOLUTE: 0.1 K/UL (ref 0–0.2)
BILIRUB SERPL-MCNC: 0.2 MG/DL (ref 0.2–1.2)
BILIRUB SERPL-MCNC: NEGATIVE MG/DL
BLOOD: ABNORMAL
BUN BLDV-MCNC: 16 MG/DL (ref 6–22)
CALCIUM SERPL-MCNC: 9.8 MG/DL (ref 8.4–10.5)
CHLORIDE BLD-SCNC: 108 MMOL/L (ref 98–110)
CHOLESTEROL/HDL RATIO: 3.5 (ref 0–5)
CHOLESTEROL: 240 MG/DL (ref 110–200)
CLARITY: CLEAR
CO2: 29 MMOL/L (ref 20–32)
COLOR: YELLOW
CREAT SERPL-MCNC: 0.9 MG/DL (ref 0.8–1.4)
EOSINOPHIL # BLD: 3 % (ref 0–6)
EOSINOPHILS ABSOLUTE: 0.2 K/UL (ref 0–0.5)
EPITHELIAL CELLS: ABNORMAL /HPF
GLOBULIN: 2.8 G/DL (ref 2–4)
GLOMERULAR FILTRATION RATE: >60 ML/MIN/1.73 SQ.M.
GLUCOSE: 105 MG/DL (ref 70–99)
GLUCOSE: NEGATIVE MG/DL
HCT VFR BLD CALC: 41.1 % (ref 35.1–48)
HDLC SERPL-MCNC: 68 MG/DL
HEMOGLOBIN: 12.2 G/DL (ref 11.7–16)
HYALINE CASTS: ABNORMAL /LPF (ref 0–2)
KETONES, URINE: NEGATIVE MG/DL
LDL CHOLESTEROL CALCULATED: 160 MG/DL (ref 50–99)
LDL/HDL RATIO: 2.4
LEUKOCYTE ESTERASE, URINE: NEGATIVE
LYMPHOCYTES # BLD: 35 % (ref 20–45)
LYMPHOCYTES ABSOLUTE: 2.1 K/UL (ref 1–4.8)
MCH RBC QN AUTO: 27 PG (ref 26–34)
MCHC RBC AUTO-ENTMCNC: 30 G/DL (ref 31–36)
MCV RBC AUTO: 92 FL (ref 80–99)
MONOCYTES ABSOLUTE: 0.6 K/UL (ref 0.1–1)
MONOCYTES: 10 % (ref 3–12)
NEUTROPHILS ABSOLUTE: 3 K/UL (ref 1.8–7.7)
NEUTROPHILS: 52 % (ref 40–75)
NITRITE, URINE: NEGATIVE
NON-HDL CHOLESTEROL: 172 MG/DL
PDW BLD-RTO: 14.5 % (ref 10–15.5)
PH, URINE: 5 PH (ref 5–8)
PLATELET # BLD: 267 K/UL (ref 140–440)
PMV BLD AUTO: 11.2 FL (ref 9–13)
POTASSIUM SERPL-SCNC: 4.2 MMOL/L (ref 3.5–5.5)
PROTEIN UA: NEGATIVE MG/DL
RBC URINE: ABNORMAL /HPF
RBC: 4.47 M/UL (ref 3.8–5.2)
SODIUM BLD-SCNC: 147 MMOL/L (ref 133–145)
SPECIFIC GRAVITY: 1.01 (ref 1–1.03)
TOTAL PROTEIN: 7 G/DL (ref 6.2–8.1)
TRIGL SERPL-MCNC: 57 MG/DL (ref 40–149)
UROBILINOGEN: 0.2 MG/DL
VLDLC SERPL CALC-MCNC: 11 MG/DL (ref 8–30)
WBC UA: ABNORMAL /HPF (ref 0–5)
WBC: 5.9 K/UL (ref 4–11)

## 2023-09-19 ENCOUNTER — OFFICE VISIT (OUTPATIENT)
Age: 63
End: 2023-09-19
Payer: COMMERCIAL

## 2023-09-19 DIAGNOSIS — M54.12 RADICULOPATHY, CERVICAL REGION: ICD-10-CM

## 2023-09-19 DIAGNOSIS — Z00.00 ROUTINE PHYSICAL EXAMINATION: Primary | ICD-10-CM

## 2023-09-19 DIAGNOSIS — R51.9 RIGHT-SIDED HEADACHE: ICD-10-CM

## 2023-09-19 DIAGNOSIS — I10 ESSENTIAL (PRIMARY) HYPERTENSION: ICD-10-CM

## 2023-09-19 DIAGNOSIS — R74.8 ELEVATED ALKALINE PHOSPHATASE LEVEL: ICD-10-CM

## 2023-09-19 DIAGNOSIS — E78.00 HYPERCHOLESTEROLEMIA: ICD-10-CM

## 2023-09-19 LAB — HEPATITIS C ANTIBODY: NORMAL

## 2023-09-19 PROCEDURE — 99396 PREV VISIT EST AGE 40-64: CPT | Performed by: INTERNAL MEDICINE

## 2023-09-19 PROCEDURE — 3077F SYST BP >= 140 MM HG: CPT | Performed by: INTERNAL MEDICINE

## 2023-09-19 PROCEDURE — 3078F DIAST BP <80 MM HG: CPT | Performed by: INTERNAL MEDICINE

## 2023-09-19 RX ORDER — METOPROLOL SUCCINATE 100 MG/1
100 TABLET, EXTENDED RELEASE ORAL DAILY
Qty: 90 TABLET | Refills: 1 | Status: SHIPPED | OUTPATIENT
Start: 2023-09-19

## 2023-09-19 RX ORDER — METHYLPREDNISOLONE 4 MG/1
TABLET ORAL
Qty: 1 KIT | Refills: 0 | Status: SHIPPED | OUTPATIENT
Start: 2023-09-19

## 2023-09-19 ASSESSMENT — PATIENT HEALTH QUESTIONNAIRE - PHQ9
1. LITTLE INTEREST OR PLEASURE IN DOING THINGS: 0
2. FEELING DOWN, DEPRESSED OR HOPELESS: 0
SUM OF ALL RESPONSES TO PHQ QUESTIONS 1-9: 0
SUM OF ALL RESPONSES TO PHQ9 QUESTIONS 1 & 2: 0
SUM OF ALL RESPONSES TO PHQ QUESTIONS 1-9: 0

## 2023-09-19 NOTE — PROGRESS NOTES
Antony Ponce presents today for   Chief Complaint   Patient presents with    Annual Exam                 1. \"Have you been to the ER, urgent care clinic since your last visit? Hospitalized since your last visit? \" no    2. \"Have you seen or consulted any other health care providers outside of the [address] [place] [address] since your last visit? \" no     3. For patients aged 43-73: Has the patient had a colonoscopy / FIT/ Cologuard? Yes - no Care Gap present      If the patient is female:    4. For patients aged 43-66: Has the patient had a mammogram within the past 2 years? No      5. For patients aged 21-65: Has the patient had a pap smear?  No

## 2023-09-20 VITALS
TEMPERATURE: 96 F | WEIGHT: 210 LBS | BODY MASS INDEX: 31.83 KG/M2 | DIASTOLIC BLOOD PRESSURE: 68 MMHG | OXYGEN SATURATION: 96 % | RESPIRATION RATE: 18 BRPM | SYSTOLIC BLOOD PRESSURE: 151 MMHG | HEART RATE: 55 BPM | HEIGHT: 68 IN

## 2023-09-20 ASSESSMENT — ENCOUNTER SYMPTOMS
SHORTNESS OF BREATH: 0
BLOOD IN STOOL: 0

## 2023-09-20 NOTE — PROGRESS NOTES
HPI     Zenia Espinoza is here for routine physical exam.  Her chief issue is continued pain in her right arm and shoulder. In the past, 1 cervical MRI suggested adenopathy and a loop of right vertebral artery possibly abutting the C4-5 level nerve. Subsequent imaging showed normal nodes, did not mention vertebral loop. She has seen 2 specialists, 1 of which is prescribed Cymbalta which she did not take. She does not want to take the medication, and denies any further evaluation at this time. She does agree to a Medrol pack, which may have helped temporarily in the past.  I advised her to let me know if she desires further evaluation. Her low back pain is improved compared to when it started. She continues to have a right-sided headache, sometimes with nausea. Unsure if better since increasing metoprolol last visit. Denies any new family history. Past Medical History:   Diagnosis Date    Bilateral numbness and tingling of arms and legs     Essential hypertension     Hypercholesterolemia         No past surgical history on file. Current Outpatient Medications   Medication Sig Dispense Refill    metoprolol succinate (TOPROL XL) 100 MG extended release tablet Take 1 tablet by mouth daily 90 tablet 1    methylPREDNISolone (MEDROL DOSEPACK) 4 MG tablet Take early in day with food, no NSAIDs while taking 1 kit 0    losartan (COZAAR) 100 MG tablet 1 po dialy 90 tablet 1     No current facility-administered medications for this visit. Allergies   Allergen Reactions    Amlodipine Other (See Comments)     Bilateral lower extremity edema.         Social History     Socioeconomic History    Marital status:      Spouse name: Not on file    Number of children: Not on file    Years of education: Not on file    Highest education level: Not on file   Occupational History    Not on file   Tobacco Use    Smoking status: Former     Packs/day: .25     Types: Cigarettes     Quit date: 1/1/2001     Years since

## 2023-10-11 ENCOUNTER — TELEPHONE (OUTPATIENT)
Age: 63
End: 2023-10-11

## 2023-10-11 NOTE — TELEPHONE ENCOUNTER
Returned call, no answer left message for patient that appointment was at 1pm tomorrow and she could reach us at 830am tomorrow if this doesn't work for her.

## 2023-10-11 NOTE — TELEPHONE ENCOUNTER
Jonathan Peña [facility] Clinical Staff  Subject: Message to Provider     QUESTIONS   Information for Provider? Pt returned a missed call from Kareem Watson at   4:28pm on Wed. for an appt time to be seen on 10/12/23. Please reach back   out to verify an appt time.    ---------------------------------------------------------------------------   --------------   Faustina Valiente [facility]   [phone]; OK to leave message on voicemail   ---------------------------------------------------------------------------   --------------   SCRIPT ANSWERS   undefined

## 2023-10-11 NOTE — TELEPHONE ENCOUNTER
Patient called into nurse triage and states she has been experiencing shooting pain, numbness and tingling in both arms, that starts in her upper back. She also is experiencing swelling in the  bottom part of her neck on both sides. Patient states the pain has been off and on and has discussed this previously but her symptoms have been worse recently. Offered patient appointment on Friday but she didn't know if there was any possibility that she can be seen sooner. She stated she really doesn't want to go to the ER. Please advise.

## 2023-10-12 ENCOUNTER — OFFICE VISIT (OUTPATIENT)
Age: 63
End: 2023-10-12
Payer: COMMERCIAL

## 2023-10-12 VITALS
DIASTOLIC BLOOD PRESSURE: 58 MMHG | TEMPERATURE: 97.3 F | HEIGHT: 68 IN | RESPIRATION RATE: 18 BRPM | BODY MASS INDEX: 31.22 KG/M2 | HEART RATE: 63 BPM | SYSTOLIC BLOOD PRESSURE: 136 MMHG | WEIGHT: 206 LBS | OXYGEN SATURATION: 95 %

## 2023-10-12 DIAGNOSIS — M54.12 RADICULOPATHY, CERVICAL REGION: Primary | ICD-10-CM

## 2023-10-12 DIAGNOSIS — I10 ESSENTIAL (PRIMARY) HYPERTENSION: ICD-10-CM

## 2023-10-12 PROCEDURE — 99214 OFFICE O/P EST MOD 30 MIN: CPT | Performed by: INTERNAL MEDICINE

## 2023-10-12 PROCEDURE — 3078F DIAST BP <80 MM HG: CPT | Performed by: INTERNAL MEDICINE

## 2023-10-12 PROCEDURE — 3075F SYST BP GE 130 - 139MM HG: CPT | Performed by: INTERNAL MEDICINE

## 2023-10-12 RX ORDER — TRAMADOL HYDROCHLORIDE 50 MG/1
100 TABLET ORAL EVERY 6 HOURS PRN
Qty: 56 TABLET | Refills: 1 | Status: SHIPPED | OUTPATIENT
Start: 2023-10-12 | End: 2023-10-26

## 2023-10-12 RX ORDER — TRAMADOL HYDROCHLORIDE 50 MG/1
50 TABLET ORAL EVERY 6 HOURS PRN
Qty: 28 TABLET | Refills: 0 | Status: CANCELLED | OUTPATIENT
Start: 2023-10-12 | End: 2023-10-19

## 2023-10-12 RX ORDER — GABAPENTIN 300 MG/1
CAPSULE ORAL
Qty: 60 CAPSULE | Refills: 3 | Status: SHIPPED | OUTPATIENT
Start: 2023-10-12 | End: 2024-01-08

## 2023-10-12 ASSESSMENT — PATIENT HEALTH QUESTIONNAIRE - PHQ9
SUM OF ALL RESPONSES TO PHQ QUESTIONS 1-9: 0
SUM OF ALL RESPONSES TO PHQ QUESTIONS 1-9: 0
2. FEELING DOWN, DEPRESSED OR HOPELESS: 0
SUM OF ALL RESPONSES TO PHQ QUESTIONS 1-9: 0
SUM OF ALL RESPONSES TO PHQ9 QUESTIONS 1 & 2: 0
1. LITTLE INTEREST OR PLEASURE IN DOING THINGS: 0
SUM OF ALL RESPONSES TO PHQ QUESTIONS 1-9: 0

## 2023-10-12 ASSESSMENT — ENCOUNTER SYMPTOMS
SHORTNESS OF BREATH: 0
BLOOD IN STOOL: 0

## 2023-10-12 NOTE — PROGRESS NOTES
Julius Tommy presents today for   Chief Complaint   Patient presents with    Pain     Neck and shoulder pain, numbness                 1. \"Have you been to the ER, urgent care clinic since your last visit? Hospitalized since your last visit? \" no    2. \"Have you seen or consulted any other health care providers outside of the [address] [place] [address] since your last visit? \" no     3. For patients aged 43-73: Has the patient had a colonoscopy / FIT/ Cologuard? Yes - no Care Gap present      If the patient is female:    4. For patients aged 43-66: Has the patient had a mammogram within the past 2 years? No      5. For patients aged 21-65: Has the patient had a pap smear?  Yes - no Care Gap present

## 2023-10-12 NOTE — PROGRESS NOTES
Pain  Associated symptoms include neck pain and numbness. Pertinent negatives include no chest pain. Ashlee Mcfarland is here for worsening pain in bilateral shoulders and trapezius areas, with bilateral hand numbness. At this point, she would like further evaluation and treatment of her symptoms. An MRI done 1 year ago showed some mild disc bulges, possible mass effect on the ventral aspect of cord. She feels like someone is squeezing both her trapezius areas, has had to miss some work this week. She previously tried gabapentin, does not think she gave it a long enough trial, and is willing to try that again. She has tried Ultram in the past, feels it works better than Vicodin that I gave her for similar symptoms in the past.  We discussed getting another MRI to see if there are any anatomical changes, and referring her for either epidural or neurosurgery consult depending on the results of her MRI. She has what she believes is an ingrown toenail, but has had so many shots in both arms, along with her baseline arm pain, but she wishes to defer a tetanus shot until the next visit. I also recommended an RSV vaccine at some point. Past Medical History:   Diagnosis Date    Bilateral numbness and tingling of arms and legs     Essential hypertension     Hypercholesterolemia         No past surgical history on file. Current Outpatient Medications   Medication Sig Dispense Refill    gabapentin (NEURONTIN) 300 MG capsule 1 Or 2 p.o. nightly 60 capsule 3    traMADol (ULTRAM) 50 MG tablet Take 2 tablets by mouth every 6 hours as needed for Pain for up to 14 days. Intended supply: 14 days. Take lowest dose possible to manage pain Max Daily Amount: 400 mg 56 tablet 1    metoprolol succinate (TOPROL XL) 100 MG extended release tablet Take 1 tablet by mouth daily 90 tablet 1    losartan (COZAAR) 100 MG tablet 1 po dialy 90 tablet 1     No current facility-administered medications for this visit.

## 2023-11-21 ENCOUNTER — TELEPHONE (OUTPATIENT)
Age: 63
End: 2023-11-21

## 2023-11-21 NOTE — TELEPHONE ENCOUNTER
She will need an updated MRI before seeing the neurosurgeon, where would she like to have that done?

## 2023-11-21 NOTE — TELEPHONE ENCOUNTER
Patient called in stating that she is still having the back pain and would like a referral for a neurosurgery.  Please Advise

## 2023-11-28 NOTE — TELEPHONE ENCOUNTER
Pt called back and was given message below , she said she is going to have to check with her insurance before she decides to have an MRI done again

## 2023-12-07 ENCOUNTER — TELEPHONE (OUTPATIENT)
Age: 63
End: 2023-12-07

## 2023-12-07 DIAGNOSIS — U07.1 COVID: Primary | ICD-10-CM

## 2023-12-07 NOTE — TELEPHONE ENCOUNTER
I sent paxlovid to [facility] outpatient pharmacy.   If they don't have the paxlovid, have her find a pharmacy that does and call us w/ info

## 2023-12-07 NOTE — TELEPHONE ENCOUNTER
Patient called in stating that she tested positive for covid yesterday. Someone at her job had it, she has not been to work since Tuesday when her symptoms started. Body aches, cough, loss voice, headaches. Patient would like a prescription sent to the pharmacy . Please Advise

## 2023-12-11 ENCOUNTER — TELEPHONE (OUTPATIENT)
Age: 63
End: 2023-12-11

## 2023-12-11 NOTE — TELEPHONE ENCOUNTER
Patient stating she requested a refill from her pharmacy for the Tramadol a week ago and was told it needed a PA. She is following up to the check the status on that.

## 2023-12-15 NOTE — TELEPHONE ENCOUNTER
PA sent to plan Dec 13th response time 1-5 business days      Patient stating she requested a refill from her pharmacy for the Tramadol a week ago and was told it needed a PA. She is following up to the check the status on that. 05-Nov-2018

## 2024-02-27 ENCOUNTER — OFFICE VISIT (OUTPATIENT)
Age: 64
End: 2024-02-27
Payer: COMMERCIAL

## 2024-02-27 ENCOUNTER — TELEPHONE (OUTPATIENT)
Age: 64
End: 2024-02-27

## 2024-02-27 VITALS
BODY MASS INDEX: 31.37 KG/M2 | SYSTOLIC BLOOD PRESSURE: 134 MMHG | TEMPERATURE: 98.1 F | HEART RATE: 64 BPM | WEIGHT: 207 LBS | DIASTOLIC BLOOD PRESSURE: 64 MMHG | RESPIRATION RATE: 18 BRPM | OXYGEN SATURATION: 95 % | HEIGHT: 68 IN

## 2024-02-27 DIAGNOSIS — G89.29 CHRONIC RIGHT SHOULDER PAIN: Primary | ICD-10-CM

## 2024-02-27 DIAGNOSIS — I10 ESSENTIAL (PRIMARY) HYPERTENSION: ICD-10-CM

## 2024-02-27 DIAGNOSIS — M25.511 CHRONIC RIGHT SHOULDER PAIN: Primary | ICD-10-CM

## 2024-02-27 PROCEDURE — 3078F DIAST BP <80 MM HG: CPT | Performed by: INTERNAL MEDICINE

## 2024-02-27 PROCEDURE — 99214 OFFICE O/P EST MOD 30 MIN: CPT | Performed by: INTERNAL MEDICINE

## 2024-02-27 PROCEDURE — 3075F SYST BP GE 130 - 139MM HG: CPT | Performed by: INTERNAL MEDICINE

## 2024-02-27 RX ORDER — LOSARTAN POTASSIUM 100 MG/1
TABLET ORAL
Qty: 90 TABLET | Refills: 1 | Status: SHIPPED | OUTPATIENT
Start: 2024-02-27

## 2024-02-27 RX ORDER — TRAMADOL HYDROCHLORIDE 50 MG/1
TABLET, COATED ORAL
Qty: 60 TABLET | Refills: 0 | Status: SHIPPED | OUTPATIENT
Start: 2024-02-27 | End: 2024-03-28

## 2024-02-27 ASSESSMENT — PATIENT HEALTH QUESTIONNAIRE - PHQ9
2. FEELING DOWN, DEPRESSED OR HOPELESS: 0
SUM OF ALL RESPONSES TO PHQ QUESTIONS 1-9: 0
SUM OF ALL RESPONSES TO PHQ QUESTIONS 1-9: 0
1. LITTLE INTEREST OR PLEASURE IN DOING THINGS: 0
SUM OF ALL RESPONSES TO PHQ9 QUESTIONS 1 & 2: 0
SUM OF ALL RESPONSES TO PHQ QUESTIONS 1-9: 0
SUM OF ALL RESPONSES TO PHQ QUESTIONS 1-9: 0

## 2024-02-28 NOTE — PROGRESS NOTES
Melissa Tejada presents today for Documentation              1. \"Have you been to the ER, urgent care clinic since your last visit?  Hospitalized since your last visit?\" no    2. \"Have you seen or consulted any other health care providers outside of the [facility] since your last visit?\" no     3. For patients aged 45-75: Has the patient had a colonoscopy / FIT/ Cologuard? No      If the patient is female:    4. For patients aged 40-74: Has the patient had a mammogram within the past 2 years? No      5. For patients aged 21-65: Has the patient had a pap smear? Yes - no Care Gap present  
vascular loop.  H&P suggest rotator cuff pathology, check MRI.Tramadol for pain  Orders:  -     MRI SHOULDER RIGHT WO CONTRAST; Future  -     ULTRAM 50 MG tablet; May take 1 tablet by mouth every 6 hours as needed for Pain (chronic shoulder pain). May also take 1 tablet every 6 hours as needed for Pain (chronic shoulder pain). Do all this for 30 days. Max Daily Amount: 400 mg.    Essential (primary) hypertension  Comments:  Controlled, continue therapy.  ARB refilled  Orders:  -     losartan (COZAAR) 100 MG tablet; 1 po dialy         No follow-up provider specified.         Sil Sanchez MD

## 2024-03-07 ENCOUNTER — TELEPHONE (OUTPATIENT)
Age: 64
End: 2024-03-07

## 2024-03-07 DIAGNOSIS — F41.9 ANXIETY: Primary | ICD-10-CM

## 2024-03-07 RX ORDER — DIAZEPAM 2 MG/1
TABLET ORAL
Qty: 3 TABLET | Refills: 0 | Status: SHIPPED | OUTPATIENT
Start: 2024-03-07 | End: 2024-05-07

## 2024-03-07 NOTE — TELEPHONE ENCOUNTER
Pt is needing to get an MRI done and stated she usually takes Valium before. Pt is requesting VALIUM be sent to [facility] outpatient pharmacy  so she can schedule the MRI

## 2024-03-14 ENCOUNTER — TELEPHONE (OUTPATIENT)
Age: 64
End: 2024-03-14

## 2024-03-14 DIAGNOSIS — M25.511 CHRONIC RIGHT SHOULDER PAIN: Primary | ICD-10-CM

## 2024-03-14 DIAGNOSIS — G89.29 CHRONIC RIGHT SHOULDER PAIN: Primary | ICD-10-CM

## 2024-03-14 RX ORDER — TRAMADOL HYDROCHLORIDE 50 MG/1
50 TABLET ORAL EVERY 6 HOURS PRN
Qty: 28 TABLET | Refills: 0 | Status: SHIPPED | OUTPATIENT
Start: 2024-03-14 | End: 2024-03-21

## 2024-03-14 NOTE — TELEPHONE ENCOUNTER
Patient called in stating that the pharmacy received her prescription for tramadol 50mg but he insurance will not feeling it unless it is the generic one. A new script need to be sen tot he pharmacy. Please Advise       [facility] [place], [place] - [address] [place] [address] - P [phone] - F [phone] [[id number]]

## 2024-03-16 ENCOUNTER — HOSPITAL ENCOUNTER (OUTPATIENT)
Facility: HOSPITAL | Age: 64
End: 2024-03-16
Attending: INTERNAL MEDICINE
Payer: COMMERCIAL

## 2024-03-16 DIAGNOSIS — M25.511 CHRONIC RIGHT SHOULDER PAIN: ICD-10-CM

## 2024-03-16 DIAGNOSIS — G89.29 CHRONIC RIGHT SHOULDER PAIN: ICD-10-CM

## 2024-03-16 PROCEDURE — 73221 MRI JOINT UPR EXTREM W/O DYE: CPT

## 2024-03-19 ENCOUNTER — TELEPHONE (OUTPATIENT)
Age: 64
End: 2024-03-19

## 2024-03-19 NOTE — TELEPHONE ENCOUNTER
MRI of her shoulder shows a torn rotator cuff, does she need a recommendation for an orthopedic surgeon.  They might start with an injection and physical therapy.

## 2024-03-20 NOTE — TELEPHONE ENCOUNTER
Sent below message to patient's mychart and left name and call back number.      MRI of her shoulder shows a torn rotator cuff, does she need a recommendation for an orthopedic surgeon.  They might start with an injection and physical therapy.

## 2024-03-22 NOTE — TELEPHONE ENCOUNTER
Patient would like a recommendation for a orthopedic surgeon    MRI of her shoulder shows a torn rotator cuff, does she need a recommendation for an orthopedic surgeon.  They might start with an injection and physical therapy.

## 2024-05-02 ENCOUNTER — TELEPHONE (OUTPATIENT)
Age: 64
End: 2024-05-02

## 2024-05-02 DIAGNOSIS — M25.519 SHOULDER PAIN, UNSPECIFIED CHRONICITY, UNSPECIFIED LATERALITY: Primary | ICD-10-CM

## 2024-05-02 RX ORDER — TRAMADOL HYDROCHLORIDE 50 MG/1
50 TABLET ORAL EVERY 6 HOURS PRN
Qty: 56 TABLET | Refills: 0 | Status: SHIPPED | OUTPATIENT
Start: 2024-05-02 | End: 2024-05-16

## 2024-05-02 NOTE — TELEPHONE ENCOUNTER
Pt called asking if  could send something into her pharmacy for her shoulderand neck pain , she states she can abhijeet lift her arm    [facility].

## 2024-05-30 ENCOUNTER — TELEPHONE (OUTPATIENT)
Age: 64
End: 2024-05-30

## 2024-05-30 NOTE — TELEPHONE ENCOUNTER
Pt called in states she is scheduled for right shoulder surgery on 6/7/24 and wants to know if she needed a pre op clearance from provider?    Please advise.

## 2024-05-31 NOTE — TELEPHONE ENCOUNTER
Left name and call back number and sent patient a Mobile Messenger message.    she needs to ask the surgeon and let me know ASAP so she can be scheduled

## 2024-07-31 ENCOUNTER — TELEPHONE (OUTPATIENT)
Facility: CLINIC | Age: 64
End: 2024-07-31

## 2024-07-31 NOTE — TELEPHONE ENCOUNTER
I don't have that info, we don't routinely check for blood type.  Any surgeon she has had procedures with might have info

## 2024-10-21 ENCOUNTER — TELEPHONE (OUTPATIENT)
Facility: CLINIC | Age: 64
End: 2024-10-21

## 2024-10-21 DIAGNOSIS — I10 ESSENTIAL (PRIMARY) HYPERTENSION: ICD-10-CM

## 2024-10-21 RX ORDER — LOSARTAN POTASSIUM 100 MG/1
TABLET ORAL
Qty: 90 TABLET | Refills: 1 | Status: SHIPPED | OUTPATIENT
Start: 2024-10-21

## 2024-10-21 NOTE — TELEPHONE ENCOUNTER
Refill req     losartan (COZAAR) 100 MG tablet     Pharmacy   [facility] [place], [place] - [address] [place] [address] DRIVE - P [phone] - F [phone] [[id number]]